# Patient Record
Sex: FEMALE | Race: BLACK OR AFRICAN AMERICAN | NOT HISPANIC OR LATINO
[De-identification: names, ages, dates, MRNs, and addresses within clinical notes are randomized per-mention and may not be internally consistent; named-entity substitution may affect disease eponyms.]

---

## 2024-02-06 PROBLEM — Z00.00 ENCOUNTER FOR PREVENTIVE HEALTH EXAMINATION: Status: ACTIVE | Noted: 2024-02-06

## 2024-02-09 ENCOUNTER — APPOINTMENT (OUTPATIENT)
Dept: COLORECTAL SURGERY | Facility: CLINIC | Age: 64
End: 2024-02-09

## 2024-02-16 ENCOUNTER — APPOINTMENT (OUTPATIENT)
Dept: COLORECTAL SURGERY | Facility: CLINIC | Age: 64
End: 2024-02-16
Payer: MEDICAID

## 2024-02-16 ENCOUNTER — NON-APPOINTMENT (OUTPATIENT)
Age: 64
End: 2024-02-16

## 2024-02-16 VITALS
BODY MASS INDEX: 37.56 KG/M2 | WEIGHT: 220 LBS | HEART RATE: 78 BPM | HEIGHT: 64 IN | SYSTOLIC BLOOD PRESSURE: 160 MMHG | TEMPERATURE: 97.5 F | DIASTOLIC BLOOD PRESSURE: 93 MMHG

## 2024-02-16 DIAGNOSIS — Z86.018 PERSONAL HISTORY OF OTHER BENIGN NEOPLASM: ICD-10-CM

## 2024-02-16 DIAGNOSIS — F17.200 NICOTINE DEPENDENCE, UNSPECIFIED, UNCOMPLICATED: ICD-10-CM

## 2024-02-16 DIAGNOSIS — M19.90 UNSPECIFIED OSTEOARTHRITIS, UNSPECIFIED SITE: ICD-10-CM

## 2024-02-16 DIAGNOSIS — Z78.9 OTHER SPECIFIED HEALTH STATUS: ICD-10-CM

## 2024-02-16 DIAGNOSIS — Z83.3 FAMILY HISTORY OF DIABETES MELLITUS: ICD-10-CM

## 2024-02-16 DIAGNOSIS — F32.A DEPRESSION, UNSPECIFIED: ICD-10-CM

## 2024-02-16 DIAGNOSIS — Z82.49 FAMILY HISTORY OF ISCHEMIC HEART DISEASE AND OTHER DISEASES OF THE CIRCULATORY SYSTEM: ICD-10-CM

## 2024-02-16 DIAGNOSIS — M54.9 DORSALGIA, UNSPECIFIED: ICD-10-CM

## 2024-02-16 DIAGNOSIS — G89.29 DORSALGIA, UNSPECIFIED: ICD-10-CM

## 2024-02-16 DIAGNOSIS — I10 ESSENTIAL (PRIMARY) HYPERTENSION: ICD-10-CM

## 2024-02-16 PROCEDURE — 99204 OFFICE O/P NEW MOD 45 MIN: CPT

## 2024-02-16 NOTE — PHYSICAL EXAM
[FreeTextEntry1] :  Gastrointestinal: No abdominal masses.   Liver: nontender.   Spleen: nontender.   General Appearance: Looks well in no distress, of stated age.   HEENT: Pupils equal reactive to light normocephalic atraumatic.   Neck: no jugular venous distention.   Respiratory: Normal breath sounds.   Cardiovascular: normal heart sounds and normal rate and rhythm.   Musculoskeletal: Moves all 4 extremities appropriately with 5 over 5 strength.   Skin:. no rash or lesion.   Neurologic: alert, oriented to person, oriented to place and oriented to time.   Psychiatric: calm.

## 2024-02-16 NOTE — ASSESSMENT
[FreeTextEntry1] : I reviewed with the patient her findings on colonoscopy are consistent with a sigmoid polyp and cecal polyp possible underlying cancer.  I recommended that we given her limited mobility we admit her to the hospital for bowel preparation, imaging and plans for advanced GI colonoscopy and attempted polypectomy of the sigmoid colon polyp.  I have outlined to her that we would proceed with a planned robotic right hemicolectomy for the management of her cecal mass.  The risks and befits of the treatment plan were reviewed and outlined with the patient. The patient understands the associated risks of the involved treatment and wishes to proceed. All questions were answered and explained.

## 2024-02-16 NOTE — HISTORY OF PRESENT ILLNESS
[FreeTextEntry1] : 62 y/o F presents for initial evaluation of cecal mass noted on screening colonoscopy Referred by GI Dr. Guido Evans  [facility] Depression, arthritis, HTN. Pt denies h/o DMII as indicated on intake form Current tobacco use x 10 years Medication list unavailable-will contact [facility] pharmacy for list and admits to taking ASA 81 mg Followed by Pain management- takes 1 tab oxycodone 30 mg three times per week at bedtime PRN for arthritis/lumbar disk pain PSH , right breast lumpectomy (benign), B/L knee replacement  Initial colonoscopy performed 24, Poor prep, scope advanced to cecum. Large mass just distal to cecum occluding 2/3 lumen w/ cancerous appearance, multiple biopsies taken. Ascending, transverse and descending colon normal. Sigmoid colon noted large polyp on stalk, however not removed 2/2 poor prep. Small internal hemorrhoids noted.  Pathology: Colon, cecum mass biopsy: Fragments of tubulovillous adenoma. There is no evidence of high grade dysplasia or malignancy.   Pt admits to onset of intermittent lower abdominal pain one month ago, walks around to relieve pain. Admits to losing 25 lbs over the last year w/ change in diet (avoiding fried foods).  Moving bowels every 2 days, soft/formed.  Not taking stool softeners or fiber supplement  No imaging ordered per pt Of note, she lives in a 5th floor walk up apartment and ambulates with walker

## 2024-03-21 ENCOUNTER — APPOINTMENT (OUTPATIENT)
Dept: INTERNAL MEDICINE | Facility: CLINIC | Age: 64
End: 2024-03-21

## 2024-03-25 ENCOUNTER — INPATIENT (INPATIENT)
Facility: HOSPITAL | Age: 64
LOS: 4 days | Discharge: HOME | DRG: 394 | End: 2024-03-30
Attending: SURGERY | Admitting: SURGERY
Payer: MEDICAID

## 2024-03-25 VITALS
TEMPERATURE: 98 F | HEART RATE: 71 BPM | SYSTOLIC BLOOD PRESSURE: 162 MMHG | HEIGHT: 65 IN | OXYGEN SATURATION: 100 % | WEIGHT: 227.96 LBS | DIASTOLIC BLOOD PRESSURE: 97 MMHG | RESPIRATION RATE: 17 BRPM

## 2024-03-25 DIAGNOSIS — Z98.891 HISTORY OF UTERINE SCAR FROM PREVIOUS SURGERY: Chronic | ICD-10-CM

## 2024-03-25 DIAGNOSIS — Z96.652 PRESENCE OF LEFT ARTIFICIAL KNEE JOINT: Chronic | ICD-10-CM

## 2024-03-25 LAB
ANION GAP SERPL CALC-SCNC: 8 MMOL/L — SIGNIFICANT CHANGE UP (ref 5–17)
APTT BLD: 33.8 SEC — SIGNIFICANT CHANGE UP (ref 24.5–35.6)
BLD GP AB SCN SERPL QL: NEGATIVE — SIGNIFICANT CHANGE UP
BUN SERPL-MCNC: 14 MG/DL — SIGNIFICANT CHANGE UP (ref 7–23)
CALCIUM SERPL-MCNC: 9.6 MG/DL — SIGNIFICANT CHANGE UP (ref 8.4–10.5)
CHLORIDE SERPL-SCNC: 103 MMOL/L — SIGNIFICANT CHANGE UP (ref 96–108)
CO2 SERPL-SCNC: 27 MMOL/L — SIGNIFICANT CHANGE UP (ref 22–31)
CREAT SERPL-MCNC: 0.79 MG/DL — SIGNIFICANT CHANGE UP (ref 0.5–1.3)
EGFR: 84 ML/MIN/1.73M2 — SIGNIFICANT CHANGE UP
GLUCOSE SERPL-MCNC: 91 MG/DL — SIGNIFICANT CHANGE UP (ref 70–99)
HCT VFR BLD CALC: 32.9 % — LOW (ref 34.5–45)
HGB BLD-MCNC: 10.6 G/DL — LOW (ref 11.5–15.5)
INR BLD: 0.88 — SIGNIFICANT CHANGE UP (ref 0.85–1.18)
MCHC RBC-ENTMCNC: 31.6 PG — SIGNIFICANT CHANGE UP (ref 27–34)
MCHC RBC-ENTMCNC: 32.2 GM/DL — SIGNIFICANT CHANGE UP (ref 32–36)
MCV RBC AUTO: 98.2 FL — SIGNIFICANT CHANGE UP (ref 80–100)
NRBC # BLD: 0 /100 WBCS — SIGNIFICANT CHANGE UP (ref 0–0)
PLATELET # BLD AUTO: 221 K/UL — SIGNIFICANT CHANGE UP (ref 150–400)
POTASSIUM SERPL-MCNC: 3.8 MMOL/L — SIGNIFICANT CHANGE UP (ref 3.5–5.3)
POTASSIUM SERPL-SCNC: 3.8 MMOL/L — SIGNIFICANT CHANGE UP (ref 3.5–5.3)
PROTHROM AB SERPL-ACNC: 10.1 SEC — SIGNIFICANT CHANGE UP (ref 9.5–13)
RBC # BLD: 3.35 M/UL — LOW (ref 3.8–5.2)
RBC # FLD: 13.3 % — SIGNIFICANT CHANGE UP (ref 10.3–14.5)
RH IG SCN BLD-IMP: POSITIVE — SIGNIFICANT CHANGE UP
RH IG SCN BLD-IMP: POSITIVE — SIGNIFICANT CHANGE UP
SODIUM SERPL-SCNC: 138 MMOL/L — SIGNIFICANT CHANGE UP (ref 135–145)
WBC # BLD: 5.13 K/UL — SIGNIFICANT CHANGE UP (ref 3.8–10.5)
WBC # FLD AUTO: 5.13 K/UL — SIGNIFICANT CHANGE UP (ref 3.8–10.5)

## 2024-03-25 PROCEDURE — 99285 EMERGENCY DEPT VISIT HI MDM: CPT

## 2024-03-25 PROCEDURE — 71260 CT THORAX DX C+: CPT | Mod: 26,MC

## 2024-03-25 PROCEDURE — 74177 CT ABD & PELVIS W/CONTRAST: CPT | Mod: 26,MC

## 2024-03-25 PROCEDURE — 71046 X-RAY EXAM CHEST 2 VIEWS: CPT | Mod: 26

## 2024-03-25 RX ORDER — IOHEXOL 300 MG/ML
30 INJECTION, SOLUTION INTRAVENOUS ONCE
Refills: 0 | Status: COMPLETED | OUTPATIENT
Start: 2024-03-25 | End: 2024-03-25

## 2024-03-25 RX ORDER — PANTOPRAZOLE SODIUM 20 MG/1
40 TABLET, DELAYED RELEASE ORAL DAILY
Refills: 0 | Status: DISCONTINUED | OUTPATIENT
Start: 2024-03-25 | End: 2024-03-30

## 2024-03-25 RX ORDER — ONDANSETRON 8 MG/1
4 TABLET, FILM COATED ORAL ONCE
Refills: 0 | Status: COMPLETED | OUTPATIENT
Start: 2024-03-25 | End: 2024-03-25

## 2024-03-25 RX ORDER — INFLUENZA VIRUS VACCINE 15; 15; 15; 15 UG/.5ML; UG/.5ML; UG/.5ML; UG/.5ML
0.5 SUSPENSION INTRAMUSCULAR ONCE
Refills: 0 | Status: DISCONTINUED | OUTPATIENT
Start: 2024-03-25 | End: 2024-03-30

## 2024-03-25 RX ORDER — ACETAMINOPHEN 500 MG
650 TABLET ORAL EVERY 6 HOURS
Refills: 0 | Status: DISCONTINUED | OUTPATIENT
Start: 2024-03-25 | End: 2024-03-27

## 2024-03-25 RX ORDER — ACETAMINOPHEN 500 MG
975 TABLET ORAL ONCE
Refills: 0 | Status: COMPLETED | OUTPATIENT
Start: 2024-03-25 | End: 2024-03-25

## 2024-03-25 RX ORDER — HEPARIN SODIUM 5000 [USP'U]/ML
5000 INJECTION INTRAVENOUS; SUBCUTANEOUS EVERY 8 HOURS
Refills: 0 | Status: DISCONTINUED | OUTPATIENT
Start: 2024-03-25 | End: 2024-03-25

## 2024-03-25 RX ORDER — ONDANSETRON 8 MG/1
4 TABLET, FILM COATED ORAL EVERY 6 HOURS
Refills: 0 | Status: DISCONTINUED | OUTPATIENT
Start: 2024-03-25 | End: 2024-03-30

## 2024-03-25 RX ORDER — SOD SULF/SODIUM/NAHCO3/KCL/PEG
4000 SOLUTION, RECONSTITUTED, ORAL ORAL ONCE
Refills: 0 | Status: COMPLETED | OUTPATIENT
Start: 2024-03-25 | End: 2024-03-25

## 2024-03-25 RX ORDER — HEPARIN SODIUM 5000 [USP'U]/ML
7500 INJECTION INTRAVENOUS; SUBCUTANEOUS EVERY 8 HOURS
Refills: 0 | Status: DISCONTINUED | OUTPATIENT
Start: 2024-03-25 | End: 2024-03-27

## 2024-03-25 RX ADMIN — Medication 975 MILLIGRAM(S): at 13:36

## 2024-03-25 RX ADMIN — IOHEXOL 30 MILLILITER(S): 300 INJECTION, SOLUTION INTRAVENOUS at 13:36

## 2024-03-25 RX ADMIN — ONDANSETRON 4 MILLIGRAM(S): 8 TABLET, FILM COATED ORAL at 13:36

## 2024-03-25 RX ADMIN — HEPARIN SODIUM 7500 UNIT(S): 5000 INJECTION INTRAVENOUS; SUBCUTANEOUS at 21:44

## 2024-03-25 NOTE — ED PROVIDER NOTE - CLINICAL SUMMARY MEDICAL DECISION MAKING FREE TEXT BOX
patient sent by surgery for admission to further evaluate of colonic mass seen on colonoscopy in February.  Plan for preop labs, will discuss with surgery   regarding other imaging or preop needs.  Plan for admit to surgery.

## 2024-03-25 NOTE — H&P ADULT - NSHPLABSRESULTS_GEN_ALL_CORE
LABS:                        10.6   5.13  )-----------( 221      ( 25 Mar 2024 13:02 )             32.9     03-25    138  |  103  |  14  ----------------------------<  91  3.8   |  27  |  0.79    Ca    9.6      25 Mar 2024 13:02      PT/INR - ( 25 Mar 2024 13:02 )   PT: 10.1 sec;   INR: 0.88          PTT - ( 25 Mar 2024 13:02 )  PTT:33.8 sec  Urinalysis Basic - ( 25 Mar 2024 13:02 )    Color: x / Appearance: x / SG: x / pH: x  Gluc: 91 mg/dL / Ketone: x  / Bili: x / Urobili: x   Blood: x / Protein: x / Nitrite: x   Leuk Esterase: x / RBC: x / WBC x   Sq Epi: x / Non Sq Epi: x / Bacteria: x        RADIOLOGY AND ADDITIONAL TESTS: Reviewed

## 2024-03-25 NOTE — ED PROVIDER NOTE - OBJECTIVE STATEMENT
62 yo F h/o Arthritis, hypertension, lumbar disc issues sent by Dr. Flynn for evaluation and admission.  Patient had outpatient colonoscopy in February that showed a cecal mass, but was poor prep.  Patient's biopsies were concerning for tubular villous adenoma.  Patient is here to have further evaluation including imaging as well as a repeat colonoscopy after better prep.  Patient notes 2 months of lower abdominal intermittent cramping pain.  Patient also notes some looser stool, nonbloody.  No weight loss, positive night sweats which patient attributes to menopause.  No family history of colon cancer, no prior colonoscopies.  No fever.  Patient notes occasional nausea.  No vomiting.  Pt also notes low back pain but feels it's similar to her disc related sx.

## 2024-03-25 NOTE — H&P ADULT - ASSESSMENT
63 year old female with PMH HTN, Depression, Arthritis presents to [facility] ED referred by Dr. Flynn for 2 month history of abdominal pain in relation to recently discovered poplyps in cecum and sigmoid colon.     Admit to Dr. Flynn, Team 5, regional  Regular diet   OR 3/28 for R hemicolectomy   Pain/nausea control PRN   Prep for colonoscopy 3/26  Advanced GI eval for colonoscopy 3/27  Preoperative risk stratification   SCDs/OOBA/SQH   Rest of plans pending clinical course   Plan of care discussed with attending and chief resident on call

## 2024-03-25 NOTE — ED PROVIDER NOTE - PROGRESS NOTE DETAILS
Discussed with surgery who would like a CT abdomen pelvis as well as preop labs, EKG, chest x-ray. CT done, results pending; surg added on ct chest.  Surg will fu on pending imaging results; per surg, tba to Dr Flynn [facility].

## 2024-03-25 NOTE — PATIENT PROFILE ADULT - FALL HARM RISK - HARM RISK INTERVENTIONS

## 2024-03-25 NOTE — H&P ADULT - NSHPPHYSICALEXAM_GEN_ALL_CORE
CONSTITUTIONAL: Awake, alert.  Nontoxic, no acute distress.  HEAD: Normocephalic, atraumatic.  EYES: Conjunctivae clear without exudates or hemorrhage. Sclera is non-icteric.  ENT: Normal appearing external ears, nose, mucous membranes moist.  NECK: supple, trachea midline.  HEART:  Normal rate, regular rhythm.    LUNGS:  No acute respiratory distress.  Non-tachypneic and non-labored.  ABDOMEN: abdomen soft, non distended, tender to palpation mildly towards RLQ, no rebound or guarding. Well healed Pfannestiel scar   MUSCULOSKELETAL:  Moving all extremities without issue.  SKIN: Skin in warm, dry and intact without rashes or lesions.  Appropriate color for ethnicity.  NEUROLOGICAL:  Patient is alert, oriented x person, place and time.  PSYCH: Appropriate mood and affect. Good judgment and insight.

## 2024-03-25 NOTE — ED ADULT TRIAGE NOTE - CHIEF COMPLAINT QUOTE
pt c/o abdominal pain and back pain x 2 months. sent by colo- rectal surgery for admission. as per noted colonoscopy 3/27, surgery 3/28 for a tubulovillous adenoma"

## 2024-03-25 NOTE — H&P ADULT - HISTORY OF PRESENT ILLNESS
63 year old female with PMH HTN, Depression, Arthritis presents to [facility] ED referred by Dr. Flynn for 2 month history of abdominal pain in relation to a recently discovered poplyps in cecum and sigmoid colon.     Initial colonoscopy performed 2/2/24, Poor prep, scope advanced to cecum.  Large mass just distal to cecum occluding 2/3 lumen w/ cancerous appearance, multiple biopsies taken. Ascending, transverse and descending colon normal. Sigmoid colon noted large polyp on stalk, however not removed 2/2 poor prep. Small internal hemorrhoids noted. 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 There is no evidence of high grade dysplasia or malignancy. Pt admits to onset of intermittent lower abdominal pain two month ago, walks around to relieve pain. Admits to losing 25 lbs over the last year w/ change in diet (avoiding fried foods). Moving bowels every once a day, soft/formed, no blood in toilet bowel or when wiping. Not taking stool softeners or fiber supplement. Patient follows up with Pain Medicine specialists for her chronic joint pain due to her arthitis (oxycodone 30mg 3 times a week). Denies fever, chills, headache, nauea, vomiting, palpitations, bloody bowel movements, urinary symptoms, acute changes in bowel pattern.     In the ED, hypertensive to 162/97, HR wnl, afebrile, saturating appropriately on RA  On exam, abdomen soft, non distended, tender to palpation mildly towards RLQ, no rebound or guarding. Well healed Pfannestiel scar   Labs significant for Hb 10.6, rest wnl   CT scan with PO and IV contrast shows

## 2024-03-25 NOTE — ED ADULT NURSE NOTE - OBJECTIVE STATEMENT
62 y/o F presents to ED as per Rina DARNELL for abdominal evaluation. Pt endorses x2 months of lower abdominal pain, loose stool (non bloody, intermittent nausea. Pt denies chest pain, SOB, fever, chills, vomiting, numbness, tingling, headache, lightheadedness, dizziness. PT A&Ox4, respirations even and unlabored, skin color WDL warm and dry, pt is ambulatory with a steady gait. No acute distress observed.

## 2024-03-26 LAB
ANION GAP SERPL CALC-SCNC: 9 MMOL/L — SIGNIFICANT CHANGE UP (ref 5–17)
BUN SERPL-MCNC: 13 MG/DL — SIGNIFICANT CHANGE UP (ref 7–23)
CALCIUM SERPL-MCNC: 9.3 MG/DL — SIGNIFICANT CHANGE UP (ref 8.4–10.5)
CEA SERPL-MCNC: 2.6 NG/ML — SIGNIFICANT CHANGE UP (ref 0–3.8)
CHLORIDE SERPL-SCNC: 103 MMOL/L — SIGNIFICANT CHANGE UP (ref 96–108)
CO2 SERPL-SCNC: 26 MMOL/L — SIGNIFICANT CHANGE UP (ref 22–31)
CREAT SERPL-MCNC: 0.89 MG/DL — SIGNIFICANT CHANGE UP (ref 0.5–1.3)
EGFR: 73 ML/MIN/1.73M2 — SIGNIFICANT CHANGE UP
GLUCOSE SERPL-MCNC: 95 MG/DL — SIGNIFICANT CHANGE UP (ref 70–99)
HCT VFR BLD CALC: 32.1 % — LOW (ref 34.5–45)
HCV AB S/CO SERPL IA: 0.04 S/CO — SIGNIFICANT CHANGE UP
HCV AB SERPL-IMP: SIGNIFICANT CHANGE UP
HGB BLD-MCNC: 10.2 G/DL — LOW (ref 11.5–15.5)
MAGNESIUM SERPL-MCNC: 2 MG/DL — SIGNIFICANT CHANGE UP (ref 1.6–2.6)
MCHC RBC-ENTMCNC: 31.8 GM/DL — LOW (ref 32–36)
MCHC RBC-ENTMCNC: 31.8 PG — SIGNIFICANT CHANGE UP (ref 27–34)
MCV RBC AUTO: 100 FL — SIGNIFICANT CHANGE UP (ref 80–100)
NRBC # BLD: 0 /100 WBCS — SIGNIFICANT CHANGE UP (ref 0–0)
PHOSPHATE SERPL-MCNC: 4.3 MG/DL — SIGNIFICANT CHANGE UP (ref 2.5–4.5)
PLATELET # BLD AUTO: 219 K/UL — SIGNIFICANT CHANGE UP (ref 150–400)
POTASSIUM SERPL-MCNC: 3.9 MMOL/L — SIGNIFICANT CHANGE UP (ref 3.5–5.3)
POTASSIUM SERPL-SCNC: 3.9 MMOL/L — SIGNIFICANT CHANGE UP (ref 3.5–5.3)
RBC # BLD: 3.21 M/UL — LOW (ref 3.8–5.2)
RBC # FLD: 13.2 % — SIGNIFICANT CHANGE UP (ref 10.3–14.5)
SODIUM SERPL-SCNC: 138 MMOL/L — SIGNIFICANT CHANGE UP (ref 135–145)
WBC # BLD: 4.84 K/UL — SIGNIFICANT CHANGE UP (ref 3.8–10.5)
WBC # FLD AUTO: 4.84 K/UL — SIGNIFICANT CHANGE UP (ref 3.8–10.5)

## 2024-03-26 PROCEDURE — 99222 1ST HOSP IP/OBS MODERATE 55: CPT | Mod: GC

## 2024-03-26 PROCEDURE — 99222 1ST HOSP IP/OBS MODERATE 55: CPT

## 2024-03-26 PROCEDURE — 99223 1ST HOSP IP/OBS HIGH 75: CPT

## 2024-03-26 RX ORDER — HYDRALAZINE HCL 50 MG
10 TABLET ORAL ONCE
Refills: 0 | Status: COMPLETED | OUTPATIENT
Start: 2024-03-26 | End: 2024-03-26

## 2024-03-26 RX ORDER — SODIUM CHLORIDE 9 MG/ML
1000 INJECTION, SOLUTION INTRAVENOUS
Refills: 0 | Status: DISCONTINUED | OUTPATIENT
Start: 2024-03-26 | End: 2024-03-27

## 2024-03-26 RX ORDER — LOSARTAN POTASSIUM 100 MG/1
50 TABLET, FILM COATED ORAL DAILY
Refills: 0 | Status: COMPLETED | OUTPATIENT
Start: 2024-03-26 | End: 2024-03-26

## 2024-03-26 RX ORDER — HYDRALAZINE HCL 50 MG
10 TABLET ORAL ONCE
Refills: 0 | Status: DISCONTINUED | OUTPATIENT
Start: 2024-03-26 | End: 2024-03-26

## 2024-03-26 RX ORDER — SOD SULF/SODIUM/NAHCO3/KCL/PEG
4000 SOLUTION, RECONSTITUTED, ORAL ORAL ONCE
Refills: 0 | Status: COMPLETED | OUTPATIENT
Start: 2024-03-26 | End: 2024-03-26

## 2024-03-26 RX ADMIN — LOSARTAN POTASSIUM 50 MILLIGRAM(S): 100 TABLET, FILM COATED ORAL at 14:29

## 2024-03-26 RX ADMIN — Medication 4000 MILLILITER(S): at 00:00

## 2024-03-26 RX ADMIN — HEPARIN SODIUM 7500 UNIT(S): 5000 INJECTION INTRAVENOUS; SUBCUTANEOUS at 14:29

## 2024-03-26 RX ADMIN — Medication 4000 MILLILITER(S): at 17:27

## 2024-03-26 RX ADMIN — Medication 650 MILLIGRAM(S): at 12:50

## 2024-03-26 RX ADMIN — Medication 10 MILLIGRAM(S): at 20:46

## 2024-03-26 RX ADMIN — Medication 650 MILLIGRAM(S): at 23:45

## 2024-03-26 RX ADMIN — Medication 650 MILLIGRAM(S): at 12:13

## 2024-03-26 RX ADMIN — Medication 10 MILLIGRAM(S): at 23:06

## 2024-03-26 RX ADMIN — PANTOPRAZOLE SODIUM 40 MILLIGRAM(S): 20 TABLET, DELAYED RELEASE ORAL at 11:05

## 2024-03-26 RX ADMIN — Medication 650 MILLIGRAM(S): at 23:06

## 2024-03-26 RX ADMIN — Medication 10 MILLIGRAM(S): at 16:49

## 2024-03-26 RX ADMIN — HEPARIN SODIUM 7500 UNIT(S): 5000 INJECTION INTRAVENOUS; SUBCUTANEOUS at 22:05

## 2024-03-26 RX ADMIN — Medication 10 MILLIGRAM(S): at 12:37

## 2024-03-26 RX ADMIN — HEPARIN SODIUM 7500 UNIT(S): 5000 INJECTION INTRAVENOUS; SUBCUTANEOUS at 05:11

## 2024-03-26 NOTE — CONSULT NOTE ADULT - SUBJECTIVE AND OBJECTIVE BOX
INTERNAL MEDICINE SERVICE INITIAL CONSULT NOTE    HPI:  63 year old female with PMH HTN, Depression, Arthritis presents to [facility] ED referred by Dr. Flynn for 2 month history of abdominal pain in relation to a recently discovered poplyps in cecum and sigmoid colon.     Initial colonoscopy performed 2/2/24, Poor prep, scope advanced to cecum.  Large mass just distal to cecum occluding 2/3 lumen w/ cancerous appearance, multiple biopsies taken. Ascending, transverse and descending colon normal. Sigmoid colon noted large polyp on stalk, however not removed 2/2 poor prep. Small internal hemorrhoids noted. 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 There is no evidence of high grade dysplasia or malignancy. Pt admits to onset of intermittent lower abdominal pain two month ago, walks around to relieve pain. Admits to losing 25 lbs over the last year w/ change in diet (avoiding fried foods). Moving bowels every once a day, soft/formed, no blood in toilet bowel or when wiping. Not taking stool softeners or fiber supplement. Patient follows up with Pain Medicine specialists for her chronic joint pain due to her arthitis (oxycodone 30mg 3 times a week). Denies fever, chills, headache, nauea, vomiting, palpitations, bloody bowel movements, urinary symptoms, acute changes in bowel pattern.     In the ED, hypertensive to 162/97, HR wnl, afebrile, saturating appropriately on RA  On exam, abdomen soft, non distended, tender to palpation mildly towards RLQ, no rebound or guarding. Well healed Pfannestiel scar   Labs significant for Hb 10.6, rest wnl   CT scan with PO and IV contrast shows    (25 Mar 2024 17:47)      ADDITIONAL MEDICINE HPI:    REVIEW OF SYSTEMS:   Otherwise negative except as specified in HPI    PAST MEDICAL HISTORY:     PAST SURGICAL HISTORY:    FAMILY HISTORY:    SOCIAL HISTORY:  Tobacco use:  EtOH use:  Illicit drug use:    MEDICATIONS:  MEDICATIONS  (STANDING):  heparin   Injectable 7500 Unit(s) SubCutaneous every 8 hours  influenza   Vaccine 0.5 milliLiter(s) IntraMuscular once  pantoprazole  Injectable 40 milliGRAM(s) IV Push daily    MEDICATIONS  (PRN):  acetaminophen     Tablet .. 650 milliGRAM(s) Oral every 6 hours PRN Temp greater or equal to 38C (100.4F), Mild Pain (1 - 3), Moderate Pain (4 - 6)  ondansetron Injectable 4 milliGRAM(s) IV Push every 6 hours PRN Nausea      ALLERGIES:  Allergies    No Known Allergies    Intolerances        VITAL SIGNS:  Vital Signs Last 24 Hrs  T(C): 36.7 (26 Mar 2024 08:10), Max: 36.8 (25 Mar 2024 12:52)  T(F): 98.1 (26 Mar 2024 08:10), Max: 98.3 (25 Mar 2024 17:34)  HR: 66 (26 Mar 2024 08:10) (60 - 71)  BP: 164/77 (26 Mar 2024 08:10) (151/75 - 164/95)  BP(mean): --  RR: 17 (26 Mar 2024 08:10) (17 - 18)  SpO2: 97% (26 Mar 2024 08:10) (97% - 100%)    Parameters below as of 26 Mar 2024 08:10  Patient On (Oxygen Delivery Method): room air        03-25-24 @ 07:01  -  03-26-24 @ 07:00  --------------------------------------------------------  IN:  Total IN: 0 mL    OUT:    Voided (mL): 200 mL  Total OUT: 200 mL    Total NET: -200 mL          PHYSICAL EXAM:  Constitutional: WDWN resting comfortably in bed; NAD  Head: NC/AT  Eyes: PERRL, EOMI, anicteric sclera  ENT: no nasal discharge; uvula midline, no oropharyngeal erythema or exudates; MMM  Neck: supple; no JVD or thyromegaly  Respiratory: CTA B/L; no W/R/R, no retractions  Cardiac: +S1/S2; RRR; no M/R/G; PMI non-displaced  Gastrointestinal: abdomen soft, NT/ND; no rebound or guarding; +BSx4  Genitourinary: normal external genitalia  Back: spine midline, no bony tenderness or step-offs; no CVAT B/L  Extremities: WWP, no clubbing or cyanosis; no peripheral edema  Musculoskeletal: NROM x4; no joint swelling, tenderness or erythema  Vascular: 2+ radial, femoral, DP/PT pulses B/L  Dermatologic: skin warm, dry and intact; no rashes, wounds, or scars  Lymphatic: no submandibular or cervical LAD  Neurologic: AAOx3; CNII-XII grossly intact; no focal deficits  Psychiatric: affect and characteristics of appearance, verbalizations, behaviors are appropriate    LABS:                        10.2   4.84  )-----------( 219      ( 26 Mar 2024 05:30 )             32.1     03-26    138  |  103  |  13  ----------------------------<  95  3.9   |  26  |  0.89    Ca    9.3      26 Mar 2024 05:30  Phos  4.3     03-26  Mg     2.0     03-26      PT/INR - ( 25 Mar 2024 13:02 )   PT: 10.1 sec;   INR: 0.88          PTT - ( 25 Mar 2024 13:02 )  PTT:33.8 sec  Urinalysis Basic - ( 26 Mar 2024 05:30 )    Color: x / Appearance: x / SG: x / pH: x  Gluc: 95 mg/dL / Ketone: x  / Bili: x / Urobili: x   Blood: x / Protein: x / Nitrite: x   Leuk Esterase: x / RBC: x / WBC x   Sq Epi: x / Non Sq Epi: x / Bacteria: x          CAPILLARY BLOOD GLUCOSE              RADIOLOGY & ADDITIONAL TESTS: Reviewed. INTERNAL MEDICINE SERVICE INITIAL CONSULT NOTE    HPI:  63 year old female with PMH HTN, Depression, Arthritis presents to [facility] ED referred by  Dr. Flynn for 2 month history of abdominal pain i/s/o recently discovered non obstructing mass just distal to cecum concerning for malignancy and pedunculated sigmoid polyp. Patient underwent her initial colonoscopy on 02/02/24 and was found to have a non-obstructing cecal mass with poor prep, s/p biopsy revealing tubulovillous adenoma. Patient presenting with persistent abdominal pain for 2 months. CTA/P revealed. 2.5 x 3.7 x 2.4 cm polypoid mass seen in the superior cecum, no definite extracolonic extension. Patient is planned for repeat colonoscopy 03/27 for polyp removal and potential hemicolectomy if needed. Patient is an active smoker, 2-3 cigs/week and drinks ETOH, last drink 1 week ago. No history of ETOH withdrawal, seizures, DTs, or intubation. On ASA for primary prevention. No recent AC or NSAID use. EKG NSR. Denies chest pain, SOB or MENDOZA on ambulation or rest. ET 4-5 blocks, 4 flights of stairs with assistance from walker. Admits to losing 25 lbs over the last year w/ change in diet (avoiding fried foods). Moving bowels every once a day, soft/formed, no blood in toilet bowel or when wiping. Not taking stool softeners or fiber supplement. Patient follows up with Pain Medicine specialists for her chronic joint pain due to her arthitis (oxycodone 30mg 3 times a week). Denies fever, chills, headache, nauea, vomiting, palpitations, bloody bowel movements, urinary symptoms, acute changes in bowel pattern. Medicine consulted for pre-op evaluation.      In the ED, hypertensive to 162/97, HR wnl, afebrile, saturating appropriately on RA  On exam, abdomen soft, non distended, tender to palpation mildly towards RLQ, no rebound or guarding. Well healed Pfannestiel scar   Labs significant for Hb 10.6, rest wnl   CT scan with PO and IV contrast shows    (25 Mar 2024 17:47)      REVIEW OF SYSTEMS:   Otherwise negative except as specified in HPI    PAST SURGICAL HISTORY:  Bilateral knee replacements, 2004    FAMILY HISTORY:  N/A    SOCIAL HISTORY:  Tobacco use: Smoker 2 cigs/week  EtOH use: Scotch, last drink last week  Illicit drug use: None    MEDICATIONS:  MEDICATIONS  (STANDING):  heparin   Injectable 7500 Unit(s) SubCutaneous every 8 hours  influenza   Vaccine 0.5 milliLiter(s) IntraMuscular once  pantoprazole  Injectable 40 milliGRAM(s) IV Push daily    MEDICATIONS  (PRN):  acetaminophen     Tablet .. 650 milliGRAM(s) Oral every 6 hours PRN Temp greater or equal to 38C (100.4F), Mild Pain (1 - 3), Moderate Pain (4 - 6)  ondansetron Injectable 4 milliGRAM(s) IV Push every 6 hours PRN Nausea      ALLERGIES:  Allergies    No Known Allergies    Intolerances        VITAL SIGNS:  Vital Signs Last 24 Hrs  T(C): 36.7 (26 Mar 2024 08:10), Max: 36.8 (25 Mar 2024 12:52)  T(F): 98.1 (26 Mar 2024 08:10), Max: 98.3 (25 Mar 2024 17:34)  HR: 66 (26 Mar 2024 08:10) (60 - 71)  BP: 164/77 (26 Mar 2024 08:10) (151/75 - 164/95)  BP(mean): --  RR: 17 (26 Mar 2024 08:10) (17 - 18)  SpO2: 97% (26 Mar 2024 08:10) (97% - 100%)    Parameters below as of 26 Mar 2024 08:10  Patient On (Oxygen Delivery Method): room air        03-25-24 @ 07:01  -  03-26-24 @ 07:00  --------------------------------------------------------  IN:  Total IN: 0 mL    OUT:    Voided (mL): 200 mL  Total OUT: 200 mL    Total NET: -200 mL          PHYSICAL EXAM:  Constitutional: WDWN resting comfortably in bed; NAD  Head: NC/AT  Eyes: PERRL, EOMI, anicteric sclera  ENT: no nasal discharge; uvula midline, no oropharyngeal erythema or exudates; MMM  Neck: supple; no JVD or thyromegaly  Respiratory: CTA B/L; no W/R/R, no retractions  Cardiac: +S1/S2; RRR; no M/R/G; PMI non-displaced  Gastrointestinal: abdomen soft, NT/ND; no rebound or guarding; +BSx4  Genitourinary: normal external genitalia  Back: spine midline, no bony tenderness or step-offs; no CVAT B/L  Extremities: WWP, no clubbing or cyanosis; no peripheral edema  Musculoskeletal: NROM x4; no joint swelling, tenderness or erythema  Vascular: 2+ radial, femoral, DP/PT pulses B/L  Dermatologic: skin warm, dry and intact; no rashes, wounds, or scars  Lymphatic: no submandibular or cervical LAD  Neurologic: AAOx3; CNII-XII grossly intact; no focal deficits  Psychiatric: affect and characteristics of appearance, verbalizations, behaviors are appropriate    LABS:                        10.2   4.84  )-----------( 219      ( 26 Mar 2024 05:30 )             32.1     03-26    138  |  103  |  13  ----------------------------<  95  3.9   |  26  |  0.89    Ca    9.3      26 Mar 2024 05:30  Phos  4.3     03-26  Mg     2.0     03-26      PT/INR - ( 25 Mar 2024 13:02 )   PT: 10.1 sec;   INR: 0.88          PTT - ( 25 Mar 2024 13:02 )  PTT:33.8 sec  Urinalysis Basic - ( 26 Mar 2024 05:30 )    Color: x / Appearance: x / SG: x / pH: x  Gluc: 95 mg/dL / Ketone: x  / Bili: x / Urobili: x   Blood: x / Protein: x / Nitrite: x   Leuk Esterase: x / RBC: x / WBC x   Sq Epi: x / Non Sq Epi: x / Bacteria: x          CAPILLARY BLOOD GLUCOSE              RADIOLOGY & ADDITIONAL TESTS: Reviewed.

## 2024-03-26 NOTE — CONSULT NOTE ADULT - ASSESSMENT
63F with PMHx HTN, Depression, Arthritis presents to [facility] ED referred by  Dr. Flynn for 2 month history of abdominal pain i/s/o recently discovered non obstructing mass just distal to cecum concerning for malignancy and pedunculated sigmoid polyp.     #Cecal Mass  #Pre-op  Patient underwent her initial colonoscopy on 02/02/24 and was found to have a non-obstructing cecal mass with poor prep, s/p biopsy revealing tubulovillous adenoma. Patient presenting with persistent abdominal pain for 2 months. CTA/P revealed. 2.5 x 3.7 x 2.4 cm polypoid mass seen in the superior cecum, no definite extracolonic extension. Patient is planned for repeat colonoscopy 03/27 for polyp removal and potential hemicolectomy if needed. Patient is an active smoker, 2-3 cigs/week and drinks ETOH, last drink 1 week ago. No history of ETOH withdrawal, seizures, DTs, or intubation. On ASA for primary prevention. No recent AC or NSAID use. EKG NSR. Denies chest pain, SOB or MENDOZA on ambulation or rest. ET 4-5 blocks, 4 flights of stairs with assistance from walker.   - RCRI Class I   - Chavez 0.4%  - METS >4 (ambulates with walker: ET 4-5 bocks, 4 flights of stairs)  - Continue to hold home ASA (for primary prevention, last dose 4 days ago)  - Offered Nicotine patch (smokes 2-3 cigarettes/week), however patient deferred  - Patient is low-risk for intermediate-risk procedure. No further cardiac work-up required at this time. Patient is medically optimized for upcoming colonoscopy/hemicolectomy    #HTN  On admission, presented with -160/90. On home Losartan 50mg. Today, patient was hypertensive to  while off Losartan s/p Hydralazine 10mg IVPx1  - Give home Losartan 50mg dose today as no planned interventions. Hold Losartan day of procedure tomorrow  - If SBP >180, can give Hydralazine 10mg IVP (preferred over Labetalol given HR 60-65)    Medicine will continue to follow along with you. Discussed case with Dr. Burnett

## 2024-03-26 NOTE — PROGRESS NOTE ADULT - SUBJECTIVE AND OBJECTIVE BOX
SUBJECTIVE: Patient seen and examined bedside by chief resident resting comfortably in bed. Tolerating clears. Patient endorses having bowel movements and voiding adequately. Patient admits to some RLQ pain and nausea without vomiting.     heparin   Injectable 7500 Unit(s) SubCutaneous every 8 hours    MEDICATIONS  (PRN):  acetaminophen     Tablet .. 650 milliGRAM(s) Oral every 6 hours PRN Temp greater or equal to 38C (100.4F), Mild Pain (1 - 3), Moderate Pain (4 - 6)  ondansetron Injectable 4 milliGRAM(s) IV Push every 6 hours PRN Nausea      I&O's Detail    25 Mar 2024 07:01  -  26 Mar 2024 07:00  --------------------------------------------------------  IN:  Total IN: 0 mL    OUT:    Voided (mL): 200 mL  Total OUT: 200 mL    Total NET: -200 mL          Vital Signs Last 24 Hrs  T(C): 36.6 (26 Mar 2024 04:26), Max: 36.8 (25 Mar 2024 12:52)  T(F): 97.9 (26 Mar 2024 04:26), Max: 98.3 (25 Mar 2024 17:34)  HR: 61 (26 Mar 2024 04:26) (60 - 71)  BP: 158/87 (26 Mar 2024 04:26) (151/75 - 164/95)  BP(mean): --  RR: 18 (26 Mar 2024 04:26) (17 - 18)  SpO2: 98% (26 Mar 2024 04:26) (97% - 100%)    Parameters below as of 26 Mar 2024 04:26  Patient On (Oxygen Delivery Method): room air        General: NAD, resting comfortably in bed  C/V: NSR  Pulm: Nonlabored breathing, no respiratory distress  Abd: soft, NT/ND  Extrem: WWP, no edema, SCDs in place    LABS:                        10.2   4.84  )-----------( 219      ( 26 Mar 2024 05:30 )             32.1     03-26    138  |  103  |  13  ----------------------------<  95  3.9   |  26  |  0.89    Ca    9.3      26 Mar 2024 05:30  Phos  4.3     03-26  Mg     2.0     03-26      PT/INR - ( 25 Mar 2024 13:02 )   PT: 10.1 sec;   INR: 0.88          PTT - ( 25 Mar 2024 13:02 )  PTT:33.8 sec  Urinalysis Basic - ( 26 Mar 2024 05:30 )    Color: x / Appearance: x / SG: x / pH: x  Gluc: 95 mg/dL / Ketone: x  / Bili: x / Urobili: x   Blood: x / Protein: x / Nitrite: x   Leuk Esterase: x / RBC: x / WBC x   Sq Epi: x / Non Sq Epi: x / Bacteria: x        RADIOLOGY & ADDITIONAL STUDIES:  CT Abdomen and Pelvis w/ Oral Cont and w/ IV Cont:   ACC: [id number] EXAM:  CT CHEST IC   ORDERED BY: RACHEL CASEY     ACC: [id number] EXAM:  CT ABDOMEN AND PELVIS OC IC   ORDERED BY: ANGEL LACY     PROCEDURE DATE:  03/25/2024          INTERPRETATION:  CLINICAL INFORMATION: Mass on recent colonoscopy.    COMPARISON: None.    CONTRAST/COMPLICATIONS:  IV Contrast: Isovue 370  90 cc administered   10 cc discarded  Oral Contrast: Gastroview  Complications: None reported at time of study completion    PROCEDURE:  CT of the Chest, Abdomenand Pelvis was performed.  Sagittal and coronal reformats were performed.    FINDINGS:  CHEST:  LUNGS AND LARGE AIRWAYS: Patent central airways. No pulmonary metastases.   3 mm calcified granuloma seen in the left lower lobe.  PLEURA: No pleural effusion.  VESSELS: Within normal limits. No central PE identified.  HEART: Heart size is normal. No pericardial effusion.  MEDIASTINUM AND RODRIGO: No lymphadenopathy.  CHEST WALL AND LOWER NECK: Enlarged left lobe thyroid containing at least   one 2.4 cm nodule. Probable right breast microclips.    ABDOMEN AND PELVIS:  LIVER: Nonenlarged. Mild fatty infiltration of liver. 1.1 cm hypodensity   in segment six and five of liver-probable cyst or biliary hamartoma.. 0.8   cm hypodensity in segment two of liver likely cyst or biliary hamartoma.  BILE DUCTS: Normal caliber.  GALLBLADDER: Within normal limits.  SPLEEN: Within normal limits.  PANCREAS: Within normal limits.  ADRENALS: Within normal limits.  KIDNEYS/URETERS: Normal size. No hydronephrosis. 4.2 cm cortical cyst   interpolar left kidney. 2.1 cm cortical cyst upper pole left kidney..    BLADDER: Within normal limits.  REPRODUCTIVE ORGANS: Anteverted fibroid uterus noted. The uterus measures   8.1 cm superoinferior dimension by 4.7 cm AP diameter by 5.3 cm diameter.   Unremarkable ovaries    BOWEL: The ingested oral contrast material has reached the mid transverse   colon. No bowel obstruction. Normal appendix. 2.5 x 3.7 x 2.4 cm polypoid   mass seen in the superior cecum. No definite extracolonic extension. 0.6   cm medial pericolic node. No bowel obstruction. Isolated colonic   diverticula.  PERITONEUM: No ascites.  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  RETROPERITONEUM/LYMPH NODES: No lymphadenopathy.  ABDOMINAL WALL: Within normal limits.  BONES: Degenerative disc disease L4-L5 and L5-S1. DISH involving thoracic   spine.    IMPRESSION:  Polypoid lesion seen in the superior cecum as described above.    No definitive evidence of metastases in the abdomen or chest.    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    --- End of Report ---            ANGELA REDDY MD; Attending Radiologist  This document has been electronically signed. Mar 25 2024  4:59PM (03-25-24 @ 16:13)      SUBJECTIVE: Patient seen and examined bedside by chief resident resting comfortably in bed. Tolerating clears. Patient endorses having bowel movements and voiding adequately. Patient admits to some RLQ pain and nausea without vomiting.     heparin   Injectable 7500 Unit(s) SubCutaneous every 8 hours    MEDICATIONS  (PRN):  acetaminophen     Tablet .. 650 milliGRAM(s) Oral every 6 hours PRN Temp greater or equal to 38C (100.4F), Mild Pain (1 - 3), Moderate Pain (4 - 6)  ondansetron Injectable 4 milliGRAM(s) IV Push every 6 hours PRN Nausea      I&O's Detail    25 Mar 2024 07:01  -  26 Mar 2024 07:00  --------------------------------------------------------  IN:  Total IN: 0 mL    OUT:    Voided (mL): 200 mL  Total OUT: 200 mL    Total NET: -200 mL          Vital Signs Last 24 Hrs  T(C): 36.6 (26 Mar 2024 04:26), Max: 36.8 (25 Mar 2024 12:52)  T(F): 97.9 (26 Mar 2024 04:26), Max: 98.3 (25 Mar 2024 17:34)  HR: 61 (26 Mar 2024 04:26) (60 - 71)  BP: 158/87 (26 Mar 2024 04:26) (151/75 - 164/95)  BP(mean): --  RR: 18 (26 Mar 2024 04:26) (17 - 18)  SpO2: 98% (26 Mar 2024 04:26) (97% - 100%)    Parameters below as of 26 Mar 2024 04:26  Patient On (Oxygen Delivery Method): room air        General: NAD, resting comfortably in bed  Pulm: Nonlabored breathing, no respiratory distress  Abd: soft, (+) RLQ ttp, non-distended  Extrem: WWP, no edema, SCDs in place    LABS:                        10.2   4.84  )-----------( 219      ( 26 Mar 2024 05:30 )             32.1     03-26    138  |  103  |  13  ----------------------------<  95  3.9   |  26  |  0.89    Ca    9.3      26 Mar 2024 05:30  Phos  4.3     03-26  Mg     2.0     03-26      PT/INR - ( 25 Mar 2024 13:02 )   PT: 10.1 sec;   INR: 0.88          PTT - ( 25 Mar 2024 13:02 )  PTT:33.8 sec  Urinalysis Basic - ( 26 Mar 2024 05:30 )    Color: x / Appearance: x / SG: x / pH: x  Gluc: 95 mg/dL / Ketone: x  / Bili: x / Urobili: x   Blood: x / Protein: x / Nitrite: x   Leuk Esterase: x / RBC: x / WBC x   Sq Epi: x / Non Sq Epi: x / Bacteria: x        RADIOLOGY & ADDITIONAL STUDIES:  CT Abdomen and Pelvis w/ Oral Cont and w/ IV Cont:   ACC: [id number] EXAM:  CT CHEST IC   ORDERED BY: RACHEL CASEY     ACC: [id number] EXAM:  CT ABDOMEN AND PELVIS OC IC   ORDERED BY: ANGEL LACY     PROCEDURE DATE:  03/25/2024          INTERPRETATION:  CLINICAL INFORMATION: Mass on recent colonoscopy.    COMPARISON: None.    CONTRAST/COMPLICATIONS:  IV Contrast: Isovue 370  90 cc administered   10 cc discarded  Oral Contrast: Gastroview  Complications: None reported at time of study completion    PROCEDURE:  CT of the Chest, Abdomenand Pelvis was performed.  Sagittal and coronal reformats were performed.    FINDINGS:  CHEST:  LUNGS AND LARGE AIRWAYS: Patent central airways. No pulmonary metastases.   3 mm calcified granuloma seen in the left lower lobe.  PLEURA: No pleural effusion.  VESSELS: Within normal limits. No central PE identified.  HEART: Heart size is normal. No pericardial effusion.  MEDIASTINUM AND RODRIGO: No lymphadenopathy.  CHEST WALL AND LOWER NECK: Enlarged left lobe thyroid containing at least   one 2.4 cm nodule. Probable right breast microclips.    ABDOMEN AND PELVIS:  LIVER: Nonenlarged. Mild fatty infiltration of liver. 1.1 cm hypodensity   in segment six and five of liver-probable cyst or biliary hamartoma.. 0.8   cm hypodensity in segment two of liver likely cyst or biliary hamartoma.  BILE DUCTS: Normal caliber.  GALLBLADDER: Within normal limits.  SPLEEN: Within normal limits.  PANCREAS: Within normal limits.  ADRENALS: Within normal limits.  KIDNEYS/URETERS: Normal size. No hydronephrosis. 4.2 cm cortical cyst   interpolar left kidney. 2.1 cm cortical cyst upper pole left kidney..    BLADDER: Within normal limits.  REPRODUCTIVE ORGANS: Anteverted fibroid uterus noted. The uterus measures   8.1 cm superoinferior dimension by 4.7 cm AP diameter by 5.3 cm diameter.   Unremarkable ovaries    BOWEL: The ingested oral contrast material has reached the mid transverse   colon. No bowel obstruction. Normal appendix. 2.5 x 3.7 x 2.4 cm polypoid   mass seen in the superior cecum. No definite extracolonic extension. 0.6   cm medial pericolic node. No bowel obstruction. Isolated colonic   diverticula.  PERITONEUM: No ascites.  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  RETROPERITONEUM/LYMPH NODES: No lymphadenopathy.  ABDOMINAL WALL: Within normal limits.  BONES: Degenerative disc disease L4-L5 and L5-S1. DISH involving thoracic   spine.    IMPRESSION:  Polypoid lesion seen in the superior cecum as described above.    No definitive evidence of metastases in the abdomen or chest.    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    --- End of Report ---            ANGELA REDDY MD; Attending Radiologist  This document has been electronically signed. Mar 25 2024  4:59PM (03-25-24 @ 16:13)      SUBJECTIVE: Patient seen and examined bedside by chief resident resting comfortably in bed. Tolerating clears. Patient endorses having bowel movements and voiding adequately. Patient admits to mild RLQ pain controlled with medication and nausea without vomiting. Denies chest pain, SOB, dizziness.     heparin   Injectable 7500 Unit(s) SubCutaneous every 8 hours    MEDICATIONS  (PRN):  acetaminophen     Tablet .. 650 milliGRAM(s) Oral every 6 hours PRN Temp greater or equal to 38C (100.4F), Mild Pain (1 - 3), Moderate Pain (4 - 6)  ondansetron Injectable 4 milliGRAM(s) IV Push every 6 hours PRN Nausea      I&O's Detail    25 Mar 2024 07:01  -  26 Mar 2024 07:00  --------------------------------------------------------  IN:  Total IN: 0 mL    OUT:    Voided (mL): 200 mL  Total OUT: 200 mL    Total NET: -200 mL          Vital Signs Last 24 Hrs  T(C): 36.6 (26 Mar 2024 04:26), Max: 36.8 (25 Mar 2024 12:52)  T(F): 97.9 (26 Mar 2024 04:26), Max: 98.3 (25 Mar 2024 17:34)  HR: 61 (26 Mar 2024 04:26) (60 - 71)  BP: 158/87 (26 Mar 2024 04:26) (151/75 - 164/95)  BP(mean): --  RR: 18 (26 Mar 2024 04:26) (17 - 18)  SpO2: 98% (26 Mar 2024 04:26) (97% - 100%)    Parameters below as of 26 Mar 2024 04:26  Patient On (Oxygen Delivery Method): room air        General: NAD, resting comfortably in bed  Pulm: Nonlabored breathing, no respiratory distress  Abd: soft, (+) RLQ ttp, non-distended  Extrem: WWP, no edema, SCDs in place    LABS:                        10.2   4.84  )-----------( 219      ( 26 Mar 2024 05:30 )             32.1     03-26    138  |  103  |  13  ----------------------------<  95  3.9   |  26  |  0.89    Ca    9.3      26 Mar 2024 05:30  Phos  4.3     03-26  Mg     2.0     03-26      PT/INR - ( 25 Mar 2024 13:02 )   PT: 10.1 sec;   INR: 0.88          PTT - ( 25 Mar 2024 13:02 )  PTT:33.8 sec  Urinalysis Basic - ( 26 Mar 2024 05:30 )    Color: x / Appearance: x / SG: x / pH: x  Gluc: 95 mg/dL / Ketone: x  / Bili: x / Urobili: x   Blood: x / Protein: x / Nitrite: x   Leuk Esterase: x / RBC: x / WBC x   Sq Epi: x / Non Sq Epi: x / Bacteria: x        RADIOLOGY & ADDITIONAL STUDIES:  CT Abdomen and Pelvis w/ Oral Cont and w/ IV Cont:   ACC: [id number] EXAM:  CT CHEST IC   ORDERED BY: RACHEL CASEY     ACC: [id number] EXAM:  CT ABDOMEN AND PELVIS OC IC   ORDERED BY: ANGEL LACY     PROCEDURE DATE:  03/25/2024          INTERPRETATION:  CLINICAL INFORMATION: Mass on recent colonoscopy.    COMPARISON: None.    CONTRAST/COMPLICATIONS:  IV Contrast: Isovue 370  90 cc administered   10 cc discarded  Oral Contrast: Gastroview  Complications: None reported at time of study completion    PROCEDURE:  CT of the Chest, Abdomenand Pelvis was performed.  Sagittal and coronal reformats were performed.    FINDINGS:  CHEST:  LUNGS AND LARGE AIRWAYS: Patent central airways. No pulmonary metastases.   3 mm calcified granuloma seen in the left lower lobe.  PLEURA: No pleural effusion.  VESSELS: Within normal limits. No central PE identified.  HEART: Heart size is normal. No pericardial effusion.  MEDIASTINUM AND RODRIGO: No lymphadenopathy.  CHEST WALL AND LOWER NECK: Enlarged left lobe thyroid containing at least   one 2.4 cm nodule. Probable right breast microclips.    ABDOMEN AND PELVIS:  LIVER: Nonenlarged. Mild fatty infiltration of liver. 1.1 cm hypodensity   in segment six and five of liver-probable cyst or biliary hamartoma.. 0.8   cm hypodensity in segment two of liver likely cyst or biliary hamartoma.  BILE DUCTS: Normal caliber.  GALLBLADDER: Within normal limits.  SPLEEN: Within normal limits.  PANCREAS: Within normal limits.  ADRENALS: Within normal limits.  KIDNEYS/URETERS: Normal size. No hydronephrosis. 4.2 cm cortical cyst   interpolar left kidney. 2.1 cm cortical cyst upper pole left kidney..    BLADDER: Within normal limits.  REPRODUCTIVE ORGANS: Anteverted fibroid uterus noted. The uterus measures   8.1 cm superoinferior dimension by 4.7 cm AP diameter by 5.3 cm diameter.   Unremarkable ovaries    BOWEL: The ingested oral contrast material has reached the mid transverse   colon. No bowel obstruction. Normal appendix. 2.5 x 3.7 x 2.4 cm polypoid   mass seen in the superior cecum. No definite extracolonic extension. 0.6   cm medial pericolic node. No bowel obstruction. Isolated colonic   diverticula.  PERITONEUM: No ascites.  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  RETROPERITONEUM/LYMPH NODES: No lymphadenopathy.  ABDOMINAL WALL: Within normal limits.  BONES: Degenerative disc disease L4-L5 and L5-S1. DISH involving thoracic   spine.    IMPRESSION:  Polypoid lesion seen in the superior cecum as described above.    No definitive evidence of metastases in the abdomen or chest.    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    --- End of Report ---            ANGELA REDDY MD; Attending Radiologist  This document has been electronically signed. Mar 25 2024  4:59PM (03-25-24 @ 16:13)

## 2024-03-26 NOTE — PROGRESS NOTE ADULT - ASSESSMENT
63 year old female with PMH HTN, Depression, Arthritis presents to [facility] ED referred by Dr. Flynn for 2 month history of abdominal pain in relation to recently discovered poplyps in cecum and sigmoid colon.     CLD/golytely at [facility]   Pain/nausea control PRN   OR 3/28 for R hemicolectomy   [facility] eval for colonoscopy 3/27  Preoperative risk stratification   SCDs/OOBA/SQH

## 2024-03-26 NOTE — CONSULT NOTE ADULT - SUBJECTIVE AND OBJECTIVE BOX
HPI:  63 year old female with PMH HTN, OA and chronic LBP on chronic opioids who presented to [facility] ED at request of Dr. Flynn for 2 month history of abdominal pain i/s/o recently discovered non obstructing mass just distal to cecum concerning for malignancy and pedunculated sigmoid polyp    Pt underwent Initial colonoscopy performed 24, Poor prep, scope advanced to cecum.    Findings:  Large mass just distal to cecum occluding 2/3 lumen w/ cancerous appearance, multiple biopsies taken. Ascending, transverse and descending colon normal. Sigmoid colon noted large polyp on stalk, however not removed 2/2 poor prep. Small internal hemorrhoids noted.     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 There is no evidence of high grade dysplasia or malignancy.     Saw Dr. Flynn as outpatient with plan for R hemicolectomy which will be done during this admission.    In ED, she describes intermittent lower abdominal pain x 2 months.  Also describes 25 lb weight loss over last year, but with dietary change.  No BRBPR.      In the ED, hypertensive to 162/97, HR wnl, afebrile, saturating appropriately on RA    CT A/P in ED showing   - The ingested oral contrast material has reached the mid transverse colon. No bowel obstruction. Normal appendix. 2.5 x 3.7 x 2.4 cm polypoid mass seen in the superior cecum. No definite extracolonic extension. 0.6 cm medial pericolic node. No bowel obstruction. Isolated colonic diverticula.  - No definitive evidence of metastases in the abdomen or chest.  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    At bedside, pt has completed 1/2 prep with continued brown stool. Still describes occasional R>LLQ. No nausea/vomiting.       Allergies    No Known Allergies    Intolerances      Home Medications:    MEDICATIONS:  MEDICATIONS  (STANDING):  heparin   Injectable 7500 Unit(s) SubCutaneous every 8 hours  influenza   Vaccine 0.5 milliLiter(s) IntraMuscular once  pantoprazole  Injectable 40 milliGRAM(s) IV Push daily    MEDICATIONS  (PRN):  acetaminophen     Tablet .. 650 milliGRAM(s) Oral every 6 hours PRN Temp greater or equal to 38C (100.4F), Mild Pain (1 - 3), Moderate Pain (4 - 6)  ondansetron Injectable 4 milliGRAM(s) IV Push every 6 hours PRN Nausea    PAST MEDICAL & SURGICAL HISTORY:  HTN (hypertension)      Lumbar herniated disc      H/O arthritis      H/O  section      H/O total knee replacement, left        FAMILY HISTORY:  No pertinent family history in first degree relatives        REVIEW OF SYSTEMS:  All other 10 review of systems is negative unless indicated above.    Vital Signs Last 24 Hrs  T(C): 36.7 (26 Mar 2024 08:10), Max: 36.8 (25 Mar 2024 12:52)  T(F): 98.1 (26 Mar 2024 08:10), Max: 98.3 (25 Mar 2024 17:34)  HR: 66 (26 Mar 2024 08:10) (60 - 71)  BP: 164/77 (26 Mar 2024 08:10) (151/75 - 164/95)  BP(mean): --  RR: 17 (26 Mar 2024 08:10) (17 - 18)  SpO2: 97% (26 Mar 2024 08:10) (97% - 100%)    Parameters below as of 26 Mar 2024 08:10  Patient On (Oxygen Delivery Method): room air         @ 07:01  -   @ 07:00  --------------------------------------------------------  IN: 0 mL / OUT: 200 mL / NET: -200 mL        PHYSICAL EXAM:    General: No acute distress  Eyes: Anicteric sclerae, moist conjunctivae  Neck: Trachea midline, supple  Lungs: Normal respiratory effort and no intercostal retractions  Abdomen: Soft, RLQ tenderness to palpation non-distended; No rebound or guarding  Extremities: Normal range of motion, No clubbing, cyanosis or edema  Neurological: Alert and oriented x3  Skin: Warm and dry. No obvious rash    LABS:                        10.2   4.84  )-----------( 219      ( 26 Mar 2024 05:30 )             32.1         138  |  103  |  13  ----------------------------<  95  3.9   |  26  |  0.89    Ca    9.3      26 Mar 2024 05:30  Phos  4.3       Mg     2.0     03-          PT/INR - ( 25 Mar 2024 13:02 )   PT: 10.1 sec;   INR: 0.88          PTT - ( 25 Mar 2024 13:02 )  PTT:33.8 sec    RADIOLOGY & ADDITIONAL STUDIES:       ACC: [id number] EXAM:  CT CHEST IC   ORDERED BY: RACHEL CASEY     ACC: [id number] EXAM:  CT ABDOMEN AND PELVIS OC IC   ORDERED BY: ANGEL LACY     PROCEDURE DATE:  2024          INTERPRETATION:  CLINICAL INFORMATION: Mass on recent colonoscopy.    COMPARISON: None.    CONTRAST/COMPLICATIONS:  IV Contrast: Isovue 370  90 cc administered   10 cc discarded  Oral Contrast: Gastroview  Complications: None reported at time of study completion    PROCEDURE:  CT of the Chest, Abdomen and Pelvis was performed.  Sagittal and coronal reformats were performed.    FINDINGS:  CHEST:  LUNGS AND LARGE AIRWAYS: Patent central airways. No pulmonary metastases.   3 mm calcified granuloma seen in the left lower lobe.  PLEURA: No pleural effusion.  VESSELS: Within normal limits. No central PE identified.  HEART: Heart size is normal. No pericardial effusion.  MEDIASTINUM AND RODRIGO: No lymphadenopathy.  CHEST WALL AND LOWER NECK: Enlarged left lobe thyroid containing at least   one 2.4 cm nodule. Probable right breast microclips.    ABDOMEN AND PELVIS:  LIVER: Nonenlarged. Mild fatty infiltration of liver. 1.1 cm hypodensity   in segment six and five of liver-probable cyst or biliary hamartoma.. 0.8   cm hypodensity in segment two of liver likely cyst or biliary hamartoma.  BILE DUCTS: Normal caliber.  GALLBLADDER: Within normal limits.  SPLEEN: Within normal limits.  PANCREAS: Within normal limits.  ADRENALS: Within normal limits.  KIDNEYS/URETERS: Normal size. No hydronephrosis. 4.2 cm cortical cyst   interpolar left kidney. 2.1 cm cortical cyst upper pole left kidney..    BLADDER: Within normal limits.  REPRODUCTIVE ORGANS: Anteverted fibroid uterus noted. The uterus measures   8.1 cm superoinferior dimension by 4.7 cm AP diameter by 5.3 cm diameter.   Unremarkable ovaries    BOWEL: The ingested oral contrast material has reached the mid transverse   colon. No bowel obstruction. Normal appendix. 2.5 x 3.7 x 2.4 cm polypoid   mass seen in the superior cecum. No definite extracolonic extension. 0.6   cm medial pericolic node. No bowel obstruction. Isolated colonic   diverticula.  PERITONEUM: No ascites.  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  RETROPERITONEUM/LYMPH NODES: No lymphadenopathy.  ABDOMINAL WALL: Within normal limits.  BONES: Degenerative disc disease L4-L5 and L5-S1. DISH involving thoracic   spine.    IMPRESSION:  Polypoid lesion seen in the superior cecum as described above.    No definitive evidence of metastases in the abdomen or chest.    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    --- End of Report ---            ANGELA REDDY MD; Attending Radiologist  This document has been electronically signed. Mar 25 2024  4:59PM

## 2024-03-26 NOTE — CONSULT NOTE ADULT - SUBJECTIVE AND OBJECTIVE BOX
HPI:  63 year old female with PMH HTN, Depression, Arthritis presents to [facility] ED referred by Dr. Flynn for 2 month history of abdominal pain in relation to a recently discovered poplyps in cecum and sigmoid colon.     Initial colonoscopy performed 24, Poor prep, scope advanced to cecum.  Large mass just distal to cecum occluding 2/3 lumen w/ cancerous appearance, multiple biopsies taken. Ascending, transverse and descending colon normal. Sigmoid colon noted large polyp on stalk, however not removed 2/2 poor prep. Small internal hemorrhoids noted. 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 There is no evidence of high grade dysplasia or malignancy. Pt admits to onset of intermittent lower abdominal pain two month ago, walks around to relieve pain. Admits to losing 25 lbs over the last year w/ change in diet (avoiding fried foods). Moving bowels every once a day, soft/formed, no blood in toilet bowel or when wiping. Not taking stool softeners or fiber supplement. Patient follows up with Pain Medicine specialists for her chronic joint pain due to her arthitis (oxycodone 30mg 3 times a week). Denies fever, chills, headache, nauea, vomiting, palpitations, bloody bowel movements, urinary symptoms, acute changes in bowel pattern.     In the ED, hypertensive to 162/97, HR wnl, afebrile, saturating appropriately on RA  On exam, abdomen soft, non distended, tender to palpation mildly towards RLQ, no rebound or guarding. Well healed Pfannestiel scar   Labs significant for Hb 10.6, rest wnl   CT scan with PO and IV contrast shows    (25 Mar 2024 17:47)      VASCULAR ADDENDUM  The patient is a 65 year old female with a PMHx of HTN, depression, neuropathy, and arthritis admitted for further workup of cecal and sigmoid polyps found on outpatient colonsocopy.  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     The patient states that she had had abdominal ongoing for several months, does not change with eating, however increases when she walks. She has had loose BMs, but no blood or melena. Endorses some nausea but no vomitting. She also endorses back pain for which she takes oxycodone for at home. She denies chest pain or shortness of breath. Has numbness and tingling in her hands, ongoing for months secondary to known neuropathy.     PMHx - HTN, depression, neuropathy, arthritis  PSHx - c section, knee surgery, lump  Meds - ASA, HCTZ, losartan, oxy, gabapentin, relistor,   Allergies - none  SoHx - 2 cig/week  FamHx- no bleeding or clotting disorders    Vitals wnl  Exam - abd soft/nontender, some fullnesss in midepigastric region.   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 1000 milliLiter(s) (120 mL/Hr) IV Continuous <Continuous>  pantoprazole  Injectable 40 milliGRAM(s) IV Push daily    MEDICATIONS  (PRN):  acetaminophen     Tablet .. 650 milliGRAM(s) Oral every 6 hours PRN Temp greater or equal to 38C (100.4F), Mild Pain (1 - 3), Moderate Pain (4 - 6)  ondansetron Injectable 4 milliGRAM(s) IV Push every 6 hours PRN Nausea      Allergies    No Known Allergies    Intolerances        SOCIAL HISTORY:    FAMILY HISTORY:  No pertinent family history in first degree relatives        Vital Signs Last 24 Hrs  T(C): 36.7 (26 Mar 2024 12:05), Max: 36.8 (25 Mar 2024 12:52)  T(F): 98 (26 Mar 2024 12:05), Max: 98.3 (25 Mar 2024 17:34)  HR: 61 (26 Mar 2024 12:34) (60 - 71)  BP: 170/83 (26 Mar 2024 12:34) (151/75 - 175/106)  BP(mean): --  RR: 18 (26 Mar 2024 12:05) (17 - 18)  SpO2: 99% (26 Mar 2024 12:05) (97% - 100%)    Parameters below as of 26 Mar 2024 12:05  Patient On (Oxygen Delivery Method): room air        PHYSICAL EXAM:   General: Patient is doing well and lying in bed comfortably  Constitutional: alert and oriented   Pulm: Nonlabored breathing, no respiratory distress  CV: Regular rate and rhythm, normal sinus rhythm  Abd: soft/nontender, some fullnesss in midepigastric region. No rebound, no guarding.   Extremities: warm, well perfused, no edema; distal pulses palpable    LABS:                        10.2   4.84  )-----------( 219      ( 26 Mar 2024 05:30 )             32.1     03-    138  |  103  |  13  ----------------------------<  95  3.9   |  26  |  0.89    Ca    9.3      26 Mar 2024 05:30  Phos  4.3     -  Mg     2.0           PT/INR - ( 25 Mar 2024 13:02 )   PT: 10.1 sec;   INR: 0.88          PTT - ( 25 Mar 2024 13:02 )  PTT:33.8 sec  Urinalysis Basic - ( 26 Mar 2024 05:30 )    Color: x / Appearance: x / SG: x / pH: x  Gluc: 95 mg/dL / Ketone: x  / Bili: x / Urobili: x   Blood: x / Protein: x / Nitrite: x   Leuk Esterase: x / RBC: x / WBC x   Sq Epi: x / Non Sq Epi: x / Bacteria: x        RADIOLOGY & ADDITIONAL STUDIES:    CT Abdomen and Pelvis w/ Oral Cont and w/ IV Cont:   ACC: [id number] EXAM:  CT CHEST IC   ORDERED BY: RACHEL CASEY     ACC: [id number] EXAM:  CT ABDOMEN AND PELVIS OC IC   ORDERED BY: ANGEL LACY     PROCEDURE DATE:  2024          INTERPRETATION:  CLINICAL INFORMATION: Mass on recent colonoscopy.    COMPARISON: None.    CONTRAST/COMPLICATIONS:  IV Contrast: Isovue 370  90 cc administered   10 cc discarded  Oral Contrast: Gastroview  Complications: None reported at time of study completion    PROCEDURE:  CT of the Chest, Abdomenand Pelvis was performed.  Sagittal and coronal reformats were performed.    FINDINGS:  CHEST:  LUNGS AND LARGE AIRWAYS: Patent central airways. No pulmonary metastases.   3 mm calcified granuloma seen in the left lower lobe.  PLEURA: No pleural effusion.  VESSELS: Within normal limits. No central PE identified.  HEART: Heart size is normal. No pericardial effusion.  MEDIASTINUM AND RODRIGO: No lymphadenopathy.  CHEST WALL AND LOWER NECK: Enlarged left lobe thyroid containing at least   one 2.4 cm nodule. Probable right breast microclips.    ABDOMEN AND PELVIS:  LIVER: Nonenlarged. Mild fatty infiltration of liver. 1.1 cm hypodensity   in segment six and five of liver-probable cyst or biliary hamartoma.. 0.8   cm hypodensity in segment two of liver likely cyst or biliary hamartoma.  BILE DUCTS: Normal caliber.  GALLBLADDER: Within normal limits.  SPLEEN: Within normal limits.  PANCREAS: Within normal limits.  ADRENALS: Within normal limits.  KIDNEYS/URETERS: Normal size. No hydronephrosis. 4.2 cm cortical cyst   interpolar left kidney. 2.1 cm cortical cyst upper pole left kidney..    BLADDER: Within normal limits.  REPRODUCTIVE ORGANS: Anteverted fibroid uterus noted. The uterus measures   8.1 cm superoinferior dimension by 4.7 cm AP diameter by 5.3 cm diameter.   Unremarkable ovaries    BOWEL: The ingested oral contrast material has reached the mid transverse   colon. No bowel obstruction. Normal appendix. 2.5 x 3.7 x 2.4 cm polypoid   mass seen in the superior cecum. No definite extracolonic extension. 0.6   cm medial pericolic node. No bowel obstruction. Isolated colonic   diverticula.  PERITONEUM: No ascites.  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  RETROPERITONEUM/LYMPH NODES: No lymphadenopathy.  ABDOMINAL WALL: Within normal limits.  BONES: Degenerative disc disease L4-L5 and L5-S1. DISH involving thoracic   spine.    IMPRESSION:  Polypoid lesion seen in the superior cecum as described above.    No definitive evidence of metastases in the abdomen or chest.    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    --- End of Report ---            ANGELA REDDY MD; Attending Radiologist  This document has been electronically signed. Mar 25 2024  4:59PM (24 @ 16:13)

## 2024-03-26 NOTE — CONSULT NOTE ADULT - ASSESSMENT
63F with PMhx HTN recently found a large mass just distal to cecum occluding 2/3 lumen with high concern for malignancy as well as a pedunculated sigmoid polyp pending colonoscopy and R hemicolectomy.    Stable at bedside. No clinical GIB.  Finished approx 1/2 golytely prep    CT chest/A/P done on admission without concern for underlying metastasis    Recommendations:  -F/u CEA  -Check iron studies; IV iron as needed  -Clear liquids today  -Finish golytely prep today  -Please give additional 1/2 dose golytely prep this evening  -NPO at midnight; tentatively scheduled for colonoscopy on 3/27 at 0730    Rodney Rico DO  Gastroenterology Fellow  Pager: [phone]  After 5PM or on weekends, please contact Eldridge Hill  for fellow on call   63F with PMhx HTN recently found a large mass just distal to cecum occluding 2/3 lumen with high concern for malignancy as well as a pedunculated sigmoid polyp pending colonoscopy and R hemicolectomy.    Stable at bedside. No clinical GIB.  Finished approx 1/2 golytely prep    CT chest/A/P done on admission without concern for underlying metastasis  2.5 x 3.7 x 2.4 cm polypoid  mass seen in the superior cecum    Recommendations:  -F/u CEA  -Check iron studies; IV iron as needed  -Clear liquids today  -Finish golytely prep today  -Please give additional 1/2 dose golytely prep this evening  -NPO at midnight; tentatively scheduled for colonoscopy on 3/27 at 0730    Rodney Rico DO  Gastroenterology Fellow  Pager: [phone]  After 5PM or on weekends, please contact Ethel Hill  for fellow on call

## 2024-03-26 NOTE — CONSULT NOTE ADULT - ASSESSMENT
The patient is a 65 year old female with a PMHx of HTN, depression, neuropathy, and arthritis admitted for further workup of cecal and sigmoid polyps found on outpatient colonsocopy.  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

## 2024-03-27 ENCOUNTER — RESULT REVIEW (OUTPATIENT)
Age: 64
End: 2024-03-27

## 2024-03-27 ENCOUNTER — TRANSCRIPTION ENCOUNTER (OUTPATIENT)
Age: 64
End: 2024-03-27

## 2024-03-27 LAB
ALBUMIN SERPL ELPH-MCNC: 3.3 G/DL — SIGNIFICANT CHANGE UP (ref 3.3–5)
ALP SERPL-CCNC: 64 U/L — SIGNIFICANT CHANGE UP (ref 40–120)
ALT FLD-CCNC: 12 U/L — SIGNIFICANT CHANGE UP (ref 10–45)
ANION GAP SERPL CALC-SCNC: 11 MMOL/L — SIGNIFICANT CHANGE UP (ref 5–17)
ANION GAP SERPL CALC-SCNC: 13 MMOL/L — SIGNIFICANT CHANGE UP (ref 5–17)
APTT BLD: 39.5 SEC — HIGH (ref 24.5–35.6)
AST SERPL-CCNC: 13 U/L — SIGNIFICANT CHANGE UP (ref 10–40)
BILIRUB DIRECT SERPL-MCNC: <0.2 MG/DL — SIGNIFICANT CHANGE UP (ref 0–0.3)
BILIRUB INDIRECT FLD-MCNC: SIGNIFICANT CHANGE UP MG/DL (ref 0.2–1)
BILIRUB SERPL-MCNC: 0.4 MG/DL — SIGNIFICANT CHANGE UP (ref 0.2–1.2)
BUN SERPL-MCNC: 4 MG/DL — LOW (ref 7–23)
BUN SERPL-MCNC: 5 MG/DL — LOW (ref 7–23)
CALCIUM SERPL-MCNC: 9.3 MG/DL — SIGNIFICANT CHANGE UP (ref 8.4–10.5)
CALCIUM SERPL-MCNC: 9.4 MG/DL — SIGNIFICANT CHANGE UP (ref 8.4–10.5)
CHLORIDE SERPL-SCNC: 104 MMOL/L — SIGNIFICANT CHANGE UP (ref 96–108)
CHLORIDE SERPL-SCNC: 107 MMOL/L — SIGNIFICANT CHANGE UP (ref 96–108)
CK MB CFR SERPL CALC: 1.4 NG/ML — SIGNIFICANT CHANGE UP (ref 0–6.7)
CK SERPL-CCNC: 68 U/L — SIGNIFICANT CHANGE UP (ref 25–170)
CO2 SERPL-SCNC: 21 MMOL/L — LOW (ref 22–31)
CO2 SERPL-SCNC: 22 MMOL/L — SIGNIFICANT CHANGE UP (ref 22–31)
CREAT SERPL-MCNC: 0.72 MG/DL — SIGNIFICANT CHANGE UP (ref 0.5–1.3)
CREAT SERPL-MCNC: 0.74 MG/DL — SIGNIFICANT CHANGE UP (ref 0.5–1.3)
EGFR: 91 ML/MIN/1.73M2 — SIGNIFICANT CHANGE UP
EGFR: 94 ML/MIN/1.73M2 — SIGNIFICANT CHANGE UP
GLUCOSE SERPL-MCNC: 146 MG/DL — HIGH (ref 70–99)
GLUCOSE SERPL-MCNC: 177 MG/DL — HIGH (ref 70–99)
HCT VFR BLD CALC: 26.8 % — LOW (ref 34.5–45)
HCT VFR BLD CALC: 31 % — LOW (ref 34.5–45)
HCT VFR BLD CALC: 32.2 % — LOW (ref 34.5–45)
HCT VFR BLD CALC: 32.7 % — LOW (ref 34.5–45)
HCT VFR BLD CALC: 32.7 % — LOW (ref 34.5–45)
HGB BLD-MCNC: 10.6 G/DL — LOW (ref 11.5–15.5)
HGB BLD-MCNC: 10.8 G/DL — LOW (ref 11.5–15.5)
HGB BLD-MCNC: 11.1 G/DL — LOW (ref 11.5–15.5)
HGB BLD-MCNC: 11.3 G/DL — LOW (ref 11.5–15.5)
HGB BLD-MCNC: 9.3 G/DL — LOW (ref 11.5–15.5)
INR BLD: 0.98 — SIGNIFICANT CHANGE UP (ref 0.85–1.18)
LACTATE SERPL-SCNC: 1.7 MMOL/L — SIGNIFICANT CHANGE UP (ref 0.5–2)
MAGNESIUM SERPL-MCNC: 1.6 MG/DL — SIGNIFICANT CHANGE UP (ref 1.6–2.6)
MCHC RBC-ENTMCNC: 31.1 PG — SIGNIFICANT CHANGE UP (ref 27–34)
MCHC RBC-ENTMCNC: 31.1 PG — SIGNIFICANT CHANGE UP (ref 27–34)
MCHC RBC-ENTMCNC: 31.9 PG — SIGNIFICANT CHANGE UP (ref 27–34)
MCHC RBC-ENTMCNC: 32 PG — SIGNIFICANT CHANGE UP (ref 27–34)
MCHC RBC-ENTMCNC: 32.3 PG — SIGNIFICANT CHANGE UP (ref 27–34)
MCHC RBC-ENTMCNC: 33.5 GM/DL — SIGNIFICANT CHANGE UP (ref 32–36)
MCHC RBC-ENTMCNC: 33.9 GM/DL — SIGNIFICANT CHANGE UP (ref 32–36)
MCHC RBC-ENTMCNC: 34.2 GM/DL — SIGNIFICANT CHANGE UP (ref 32–36)
MCHC RBC-ENTMCNC: 34.6 GM/DL — SIGNIFICANT CHANGE UP (ref 32–36)
MCHC RBC-ENTMCNC: 34.7 GM/DL — SIGNIFICANT CHANGE UP (ref 32–36)
MCV RBC AUTO: 90.1 FL — SIGNIFICANT CHANGE UP (ref 80–100)
MCV RBC AUTO: 91.6 FL — SIGNIFICANT CHANGE UP (ref 80–100)
MCV RBC AUTO: 92.1 FL — SIGNIFICANT CHANGE UP (ref 80–100)
MCV RBC AUTO: 94.5 FL — SIGNIFICANT CHANGE UP (ref 80–100)
MCV RBC AUTO: 95 FL — SIGNIFICANT CHANGE UP (ref 80–100)
NRBC # BLD: 0 /100 WBCS — SIGNIFICANT CHANGE UP (ref 0–0)
NT-PROBNP SERPL-SCNC: 375 PG/ML — HIGH (ref 0–300)
PHOSPHATE SERPL-MCNC: 4.4 MG/DL — SIGNIFICANT CHANGE UP (ref 2.5–4.5)
PLATELET # BLD AUTO: 148 K/UL — LOW (ref 150–400)
PLATELET # BLD AUTO: 168 K/UL — SIGNIFICANT CHANGE UP (ref 150–400)
PLATELET # BLD AUTO: 201 K/UL — SIGNIFICANT CHANGE UP (ref 150–400)
PLATELET # BLD AUTO: 203 K/UL — SIGNIFICANT CHANGE UP (ref 150–400)
PLATELET # BLD AUTO: 208 K/UL — SIGNIFICANT CHANGE UP (ref 150–400)
POTASSIUM SERPL-MCNC: 3.5 MMOL/L — SIGNIFICANT CHANGE UP (ref 3.5–5.3)
POTASSIUM SERPL-MCNC: 3.5 MMOL/L — SIGNIFICANT CHANGE UP (ref 3.5–5.3)
POTASSIUM SERPL-SCNC: 3.5 MMOL/L — SIGNIFICANT CHANGE UP (ref 3.5–5.3)
POTASSIUM SERPL-SCNC: 3.5 MMOL/L — SIGNIFICANT CHANGE UP (ref 3.5–5.3)
PROT SERPL-MCNC: 6 G/DL — SIGNIFICANT CHANGE UP (ref 6–8.3)
PROTHROM AB SERPL-ACNC: 11.2 SEC — SIGNIFICANT CHANGE UP (ref 9.5–13)
RBC # BLD: 2.91 M/UL — LOW (ref 3.8–5.2)
RBC # BLD: 3.28 M/UL — LOW (ref 3.8–5.2)
RBC # BLD: 3.39 M/UL — LOW (ref 3.8–5.2)
RBC # BLD: 3.57 M/UL — LOW (ref 3.8–5.2)
RBC # BLD: 3.63 M/UL — LOW (ref 3.8–5.2)
RBC # FLD: 13.3 % — SIGNIFICANT CHANGE UP (ref 10.3–14.5)
RBC # FLD: 13.4 % — SIGNIFICANT CHANGE UP (ref 10.3–14.5)
RBC # FLD: 13.5 % — SIGNIFICANT CHANGE UP (ref 10.3–14.5)
RBC # FLD: 14.8 % — HIGH (ref 10.3–14.5)
RBC # FLD: 15.4 % — HIGH (ref 10.3–14.5)
SODIUM SERPL-SCNC: 138 MMOL/L — SIGNIFICANT CHANGE UP (ref 135–145)
SODIUM SERPL-SCNC: 140 MMOL/L — SIGNIFICANT CHANGE UP (ref 135–145)
TROPONIN T, HIGH SENSITIVITY RESULT: <6 NG/L — SIGNIFICANT CHANGE UP (ref 0–51)
WBC # BLD: 10.45 K/UL — SIGNIFICANT CHANGE UP (ref 3.8–10.5)
WBC # BLD: 10.69 K/UL — HIGH (ref 3.8–10.5)
WBC # BLD: 12.53 K/UL — HIGH (ref 3.8–10.5)
WBC # BLD: 9.5 K/UL — SIGNIFICANT CHANGE UP (ref 3.8–10.5)
WBC # BLD: 9.86 K/UL — SIGNIFICANT CHANGE UP (ref 3.8–10.5)
WBC # FLD AUTO: 10.45 K/UL — SIGNIFICANT CHANGE UP (ref 3.8–10.5)
WBC # FLD AUTO: 10.69 K/UL — HIGH (ref 3.8–10.5)
WBC # FLD AUTO: 12.53 K/UL — HIGH (ref 3.8–10.5)
WBC # FLD AUTO: 9.5 K/UL — SIGNIFICANT CHANGE UP (ref 3.8–10.5)
WBC # FLD AUTO: 9.86 K/UL — SIGNIFICANT CHANGE UP (ref 3.8–10.5)

## 2024-03-27 PROCEDURE — 99223 1ST HOSP IP/OBS HIGH 75: CPT | Mod: 25

## 2024-03-27 PROCEDURE — 36245 INS CATH ABD/L-EXT ART 1ST: CPT | Mod: 59

## 2024-03-27 PROCEDURE — 88305 TISSUE EXAM BY PATHOLOGIST: CPT | Mod: 26,59

## 2024-03-27 PROCEDURE — 75726 ARTERY X-RAYS ABDOMEN: CPT | Mod: 26,59

## 2024-03-27 PROCEDURE — 45388 COLONOSCOPY W/ABLATION: CPT | Mod: 59

## 2024-03-27 PROCEDURE — 36246 INS CATH ABD/L-EXT ART 2ND: CPT

## 2024-03-27 PROCEDURE — 45390 COLONOSCOPY W/RESECTION: CPT | Mod: 59

## 2024-03-27 PROCEDURE — 75774 ARTERY X-RAY EACH VESSEL: CPT | Mod: 26

## 2024-03-27 PROCEDURE — 99232 SBSQ HOSP IP/OBS MODERATE 35: CPT | Mod: GC

## 2024-03-27 PROCEDURE — 99232 SBSQ HOSP IP/OBS MODERATE 35: CPT

## 2024-03-27 PROCEDURE — 36248 INS CATH ABD/L-EXT ART ADDL: CPT

## 2024-03-27 PROCEDURE — 93010 ELECTROCARDIOGRAM REPORT: CPT

## 2024-03-27 DEVICE — CLIP HEMO INSTINCT PLUS ENDOSCOPIC: Type: IMPLANTABLE DEVICE | Status: FUNCTIONAL

## 2024-03-27 RX ORDER — HYDRALAZINE HCL 50 MG
10 TABLET ORAL ONCE
Refills: 0 | Status: COMPLETED | OUTPATIENT
Start: 2024-03-27 | End: 2024-03-27

## 2024-03-27 RX ORDER — POTASSIUM CHLORIDE 20 MEQ
10 PACKET (EA) ORAL
Refills: 0 | Status: COMPLETED | OUTPATIENT
Start: 2024-03-27 | End: 2024-03-27

## 2024-03-27 RX ORDER — THIAMINE MONONITRATE (VIT B1) 100 MG
500 TABLET ORAL ONCE
Refills: 0 | Status: COMPLETED | OUTPATIENT
Start: 2024-03-27 | End: 2024-03-27

## 2024-03-27 RX ORDER — MAGNESIUM SULFATE 500 MG/ML
2 VIAL (ML) INJECTION ONCE
Refills: 0 | Status: COMPLETED | OUTPATIENT
Start: 2024-03-27 | End: 2024-03-27

## 2024-03-27 RX ORDER — NICOTINE POLACRILEX 2 MG
1 GUM BUCCAL DAILY
Refills: 0 | Status: DISCONTINUED | OUTPATIENT
Start: 2024-03-27 | End: 2024-03-28

## 2024-03-27 RX ORDER — GABAPENTIN 400 MG/1
300 CAPSULE ORAL EVERY 12 HOURS
Refills: 0 | Status: DISCONTINUED | OUTPATIENT
Start: 2024-03-27 | End: 2024-03-30

## 2024-03-27 RX ORDER — SODIUM CHLORIDE 9 MG/ML
500 INJECTION, SOLUTION INTRAVENOUS ONCE
Refills: 0 | Status: COMPLETED | OUTPATIENT
Start: 2024-03-27 | End: 2024-03-27

## 2024-03-27 RX ADMIN — Medication 10 MILLIGRAM(S): at 10:13

## 2024-03-27 RX ADMIN — Medication 100 MILLIEQUIVALENT(S): at 17:05

## 2024-03-27 RX ADMIN — HEPARIN SODIUM 7500 UNIT(S): 5000 INJECTION INTRAVENOUS; SUBCUTANEOUS at 06:10

## 2024-03-27 RX ADMIN — Medication 100 MILLIEQUIVALENT(S): at 14:04

## 2024-03-27 RX ADMIN — Medication 100 MILLIEQUIVALENT(S): at 18:29

## 2024-03-27 RX ADMIN — Medication 10 MILLIGRAM(S): at 11:01

## 2024-03-27 RX ADMIN — Medication 650 MILLIGRAM(S): at 05:23

## 2024-03-27 RX ADMIN — Medication 650 MILLIGRAM(S): at 04:23

## 2024-03-27 RX ADMIN — PANTOPRAZOLE SODIUM 40 MILLIGRAM(S): 20 TABLET, DELAYED RELEASE ORAL at 11:02

## 2024-03-27 RX ADMIN — Medication 25 GRAM(S): at 15:04

## 2024-03-27 RX ADMIN — SODIUM CHLORIDE 1000 MILLILITER(S): 9 INJECTION, SOLUTION INTRAVENOUS at 13:00

## 2024-03-27 RX ADMIN — SODIUM CHLORIDE 500 MILLILITER(S): 9 INJECTION, SOLUTION INTRAVENOUS at 15:05

## 2024-03-27 RX ADMIN — SODIUM CHLORIDE 120 MILLILITER(S): 9 INJECTION, SOLUTION INTRAVENOUS at 10:16

## 2024-03-27 RX ADMIN — Medication 105 MILLIGRAM(S): at 17:06

## 2024-03-27 RX ADMIN — GABAPENTIN 300 MILLIGRAM(S): 400 CAPSULE ORAL at 19:31

## 2024-03-27 NOTE — PRE-ANESTHESIA EVALUATION ADULT - NSANTHPMHFT_GEN_ALL_CORE
63F with PMHx HTN, Depression, Arthritis presents to [facility] ED referred by  Dr. Flynn for 2 month history of abdominal pain i/s/o recently discovered non obstructing mass just distal to cecum concerning for malignancy and pedunculated sigmoid polyp.

## 2024-03-27 NOTE — DIETITIAN INITIAL EVALUATION ADULT - OTHER INFO
63 year old female active smoker with PMH EtOH use, HTN, Depression, Arthritis with recently discovered cecal and sigmoid polyps with pathology consistent with tubulovillous adenoma admitted for endoscopic resection vs. R hemicolectomy now s/p colonoscopy with cecal and sigmoid polypectomy c/b brisk bleeding from cecal polyp stalk (8 clips placed) requiring 1U PRBC and intermittent phenylephrine transferred to SICU for HD monitoring.    Chart reviewed. Pt seen at bedside on 5 EA, on room air. Currently NPO, on IV LR. Confirms no known food allergies, food preferences obtained & entered into [employer]. Pt denies recent changes to appetite or intake, endorses good intake at baseline- has been trying to lose weight over past year through healthy eating [more fruits & vegetables, fish, less fried foods]. Pt denies recent weight gain, stated she has maybe "lost a few ounces." No overt muscle wasting/subcutaneous losses per NFPE. Pt reported few days of loose BM/diarrhea, no other s/s GI distress noted. Labs reviewed- bicarb 21 <L>. Meds- IV thiamine, protonix, KCl. RDN will continue to monitor, reassess, and intervene as appropriate.        Pain: no pain/discomfort noted  Skin: no pressure injuries noted; Chencho score 19  GI: soft, nontender/nondistended 63 year old female active smoker with PMH EtOH use, HTN, Depression, Arthritis with recently discovered cecal and sigmoid polyps with pathology consistent with tubulovillous adenoma admitted for endoscopic resection vs. R hemicolectomy now s/p colonoscopy with cecal and sigmoid polypectomy c/b brisk bleeding from cecal polyp stalk (8 clips placed) requiring 1U PRBC and intermittent phenylephrine transferred to SICU for HD monitoring.    Chart reviewed. Pt seen at bedside on 5 EA, on room air. Currently NPO, on IV LR. Confirms no known food allergies, food preferences obtained & entered into [employer]. Pt denies recent changes to appetite or intake, endorses good intake at baseline- has been trying to lose weight over past year through healthy eating [more fruits & vegetables, fish, less fried foods]. Pt denies recent weight loss, stated she has maybe "lost a few ounces." No overt muscle wasting/subcutaneous losses per NFPE. Pt reported few days of loose BM/diarrhea, no other s/s GI distress noted. Labs reviewed- bicarb 21 <L>. Meds- IV thiamine, protonix, KCl. RDN will continue to monitor, reassess, and intervene as appropriate.        Pain: no pain/discomfort noted  Skin: no pressure injuries noted; Chencho score 19  GI: soft, nontender/nondistended

## 2024-03-27 NOTE — CONSULT NOTE ADULT - SUBJECTIVE AND OBJECTIVE BOX
HPI:  63 year old female with PMH HTN, Depression, Arthritis presents to [facility] ED referred by Dr. Flynn for 2 month history of abdominal pain in relation to a recently discovered poplyps in cecum and sigmoid colon.     Initial colonoscopy performed 2/2/24, Poor prep, scope advanced to cecum.  Large mass just distal to cecum occluding 2/3 lumen w/ cancerous appearance, multiple biopsies taken. Ascending, transverse and descending colon normal. Sigmoid colon noted large polyp on stalk, however not removed 2/2 poor prep. Small internal hemorrhoids noted. 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 There is no evidence of high grade dysplasia or malignancy. Pt admits to onset of intermittent lower abdominal pain two month ago, walks around to relieve pain. Admits to losing 25 lbs over the last year w/ change in diet (avoiding fried foods). Moving bowels every once a day, soft/formed, no blood in toilet bowel or when wiping. Not taking stool softeners or fiber supplement. Patient follows up with Pain Medicine specialists for her chronic joint pain due to her arthitis (oxycodone 30mg 3 times a week). Denies fever, chills, headache, nauea, vomiting, palpitations, bloody bowel movements, urinary symptoms, acute changes in bowel pattern.     In the ED, hypertensive to 162/97, HR wnl, afebrile, saturating appropriately on RA  On exam, abdomen soft, non distended, tender to palpation mildly towards RLQ, no rebound or guarding. Well healed Pfannestiel scar   Labs significant for Hb 10.6, rest wnl   CT scan with PO and IV contrast shows    (25 Mar 2024 17:47)      SICU ADDENDUM:  63 year old female with PMH HTN, Depression, Arthritis with recently discovered cecal and sigmoid polyps with pathology consistent with tubulovillous adenoma admitted for endoscopic resection vs. R hemicolectomy now s/p colonoscopy with cecal and sigmoid polypectomy c/b brisk bleeding from cecal polyp stalk (8 clips placed) requiring 1U PRBC and intermittent phenylephrine transferred to SICU for HD monitoring. At this time, patient has no acute complaints, denies cp, sob, LOC, dizziness.      ICU Vital Signs Last 24 Hrs  T(F): 98.3 (03-27-24 @ 04:45), Max: 98.3 (03-27-24 @ 04:45)  HR: 75 (03-27-24 @ 07:27) (61 - 75)  BP: 160/74 (03-27-24 @ 07:27) (153/75 - 191/105)  BP(mean): --  ABP: --  RR: 17 (03-27-24 @ 07:27) (16 - 18)  SpO2: 98% (03-27-24 @ 07:27) (98% - 100%)    General: NAD, resting comfortably in bed. Well developed, well groomed  NEURO: AAOx3, CN II-XII grossly intact, EOMI, follows commands  HEENT: PERRL, MMM  CV: RRR, no MRG  PULM: CTAB, nonlabored breathing, no respiratory distress  ABD: soft, NT/ND.   EXTREM: WWP, no edema, no calf tenderness  VASC: No cyanosis, pallor. Palpable radial and PT bilaterally  SKIN: No rashes noted  PSYCH: Appropriate affect, answers questions appropriately    LABS:    03-26    138  |  103  |  13  ----------------------------<  95  3.9   |  26  |  0.89    Ca    9.3      26 Mar 2024 05:30  Phos  4.3     03-26  Mg     2.0     03-26                          10.2   4.84  )-----------( 219      ( 26 Mar 2024 05:30 )             32.1   PT/INR - ( 25 Mar 2024 13:02 )   PT: 10.1 sec;   INR: 0.88          PTT - ( 25 Mar 2024 13:02 )  PTT:33.8 sec  Urinalysis Basic - ( 26 Mar 2024 05:30 )               Pt BIBA presents with c/o lower back and bilateral hip pain since 11am, states hx of sickle cell. Pt states took 8mg Dilaudid at 11:00.

## 2024-03-27 NOTE — PROGRESS NOTE ADULT - ASSESSMENT
63F with PMHx HTN, Depression, Arthritis presents to [facility] ED referred by  Dr. Flynn for 2 month history of abdominal pain i/s/o recently discovered non obstructing mass just distal to cecum concerning for malignancy and pedunculated sigmoid polyp.     #Cecal Mass  #Pre-op  Patient underwent her initial colonoscopy on 02/02/24 and was found to have a non-obstructing cecal mass with poor prep, s/p biopsy revealing tubulovillous adenoma. Patient presenting with persistent abdominal pain for 2 months. CTA/P revealed. 2.5 x 3.7 x 2.4 cm polypoid mass seen in the superior cecum, no definite extracolonic extension. Patient is planned for repeat colonoscopy 03/27 for polyp removal and potential hemicolectomy if needed. Patient is an active smoker, 2-3 cigs/week and drinks ETOH, last drink 1 week ago. No history of ETOH withdrawal, seizures, DTs, or intubation. On ASA for primary prevention. No recent AC or NSAID use. EKG NSR. Denies chest pain, SOB or MENDOZA on ambulation or rest. ET 4-5 blocks, 4 flights of stairs with assistance from walker.   - RCRI Class I   - Chavez 0.4%  - METS >4 (ambulates with walker: ET 4-5 bocks, 4 flights of stairs)  - Continue to hold home ASA (for primary prevention, last dose 4 days ago)  - Offered Nicotine patch (smokes 2-3 cigarettes/week), however patient deferred  - Patient is low-risk for intermediate-risk procedure. No further cardiac work-up required at this time. Patient is medically optimized for upcoming colonoscopy/hemicolectomy  - S/p Colonoscopy 3/27: 7cm pedunculated and multilobular polyp in the cecum s/p snare polypectomy c/b acute bleeding s/p epinephrine injection and 8 endoclips. An additional single pedunculated 8 mm polyp of benign appearance was found in the sigmoid colon s/p polypectomy and 1 endoclip. Patient was transfused 1U pRBC given acute bleeding during the procedure and transferred to the SICU for monitoring.    #HTN  On admission, presented with -160/90. On home Losartan 50mg. Today, patient was hypertensive to  while off Losartan s/p Hydralazine 10mg IVPx1  - Continue home Losartan 50mg qd if patient remains hemodynamically stable  - If SBP >180, can give Hydralazine 10mg IVP (preferred over Labetalol given HR 60-65)    Medicine will continue to follow along with you. Discussed case with Dr. Burnett

## 2024-03-27 NOTE — DIETITIAN INITIAL EVALUATION ADULT - PERTINENT MEDS FT
MEDICATIONS  (STANDING):  gabapentin 300 milliGRAM(s) Oral every 12 hours  influenza   Vaccine 0.5 milliLiter(s) IntraMuscular once  lactated ringers Bolus 500 milliLiter(s) IV Bolus once  lactated ringers Bolus 500 milliLiter(s) IV Bolus once  magnesium sulfate  IVPB 2 Gram(s) IV Intermittent once  nicotine -  14 mG/24Hr(s) Patch 1 Patch Transdermal daily  pantoprazole  Injectable 40 milliGRAM(s) IV Push daily  potassium chloride  10 mEq/100 mL IVPB 10 milliEquivalent(s) IV Intermittent every 1 hour  thiamine IVPB 500 milliGRAM(s) IV Intermittent once    MEDICATIONS  (PRN):  ondansetron Injectable 4 milliGRAM(s) IV Push every 6 hours PRN Nausea

## 2024-03-27 NOTE — PRE-ANESTHESIA EVALUATION ADULT - NSANTHDIETYNSD_GEN_ALL_CORE
Yes
Yes
[Follow-Up: _____] : a [unfilled] follow-up visit 
[Patient] : patient
[Family Member] : family member

## 2024-03-27 NOTE — PROGRESS NOTE ADULT - SUBJECTIVE AND OBJECTIVE BOX
INTERVAL HPI/OVERNIGHT EVENTS:  No acute events overnight.     SUBJECTIVE: Patient seen and examined at bedside, resting comfortably in bed, and does not appear to be in any acute distress. Patient had already underwent colonoscopy this morning.     MEDICATIONS  (STANDING):  gabapentin 300 milliGRAM(s) Oral every 12 hours  influenza   Vaccine 0.5 milliLiter(s) IntraMuscular once  lactated ringers Bolus 500 milliLiter(s) IV Bolus once  lactated ringers Bolus 500 milliLiter(s) IV Bolus once  magnesium sulfate  IVPB 2 Gram(s) IV Intermittent once  nicotine -  14 mG/24Hr(s) Patch 1 Patch Transdermal daily  pantoprazole  Injectable 40 milliGRAM(s) IV Push daily  potassium chloride  10 mEq/100 mL IVPB 10 milliEquivalent(s) IV Intermittent every 1 hour  thiamine IVPB 500 milliGRAM(s) IV Intermittent once    MEDICATIONS  (PRN):  ondansetron Injectable 4 milliGRAM(s) IV Push every 6 hours PRN Nausea      Vital Signs Last 24 Hrs  T(C): 36.9 (27 Mar 2024 12:00), Max: 36.9 (27 Mar 2024 12:00)  T(F): 98.5 (27 Mar 2024 12:00), Max: 98.5 (27 Mar 2024 12:00)  HR: 104 (27 Mar 2024 12:00) (63 - 104)  BP: 154/91 (27 Mar 2024 12:00) (153/75 - 193/95)  BP(mean): 115 (27 Mar 2024 12:00) (115 - 132)  RR: 28 (27 Mar 2024 12:00) (16 - 28)  SpO2: 100% (27 Mar 2024 12:00) (98% - 100%)    Parameters below as of 27 Mar 2024 12:00  Patient On (Oxygen Delivery Method): nasal cannula  O2 Flow (L/min): 2      PHYSICAL EXAM:  General: in no acute distress  Eyes: EOMI intact bilaterally. Anicteric sclerae, moist conjunctivae  HENT: Moist mucous membranes  Neck: Trachea midline, supple  Lungs: CTA B/L. No wheezes, rales, or rhonchi  Cardiovascular: RRR. No murmurs, rubs, or gallops  Abdomen: Soft, non-tender non-distended; No rebound or guarding  Extremities: WWP, No clubbing, cyanosis or edema  Neurological: Alert and oriented  Skin: Warm and dry. No obvious rash     LABS:                        9.3    10.45 )-----------( 201      ( 27 Mar 2024 14:00 )             26.8     03-27    138  |  104  |  4<L>  ----------------------------<  177<H>  3.5   |  21<L>  |  0.72    Ca    9.4      27 Mar 2024 12:51  Phos  4.4     03-27  Mg     1.6     03-27    TPro  6.0  /  Alb  3.3  /  TBili  0.4  /  DBili  <0.2  /  AST  13  /  ALT  12  /  AlkPhos  64  03-27    PT/INR - ( 27 Mar 2024 09:55 )   PT: 11.2 sec;   INR: 0.98          PTT - ( 27 Mar 2024 09:55 )  PTT:39.5 sec  Urinalysis Basic - ( 27 Mar 2024 12:51 )    Color: x / Appearance: x / SG: x / pH: x  Gluc: 177 mg/dL / Ketone: x  / Bili: x / Urobili: x   Blood: x / Protein: x / Nitrite: x   Leuk Esterase: x / RBC: x / WBC x   Sq Epi: x / Non Sq Epi: x / Bacteria: x        MICROBIOLOGY:    RADIOLOGY & ADDITIONAL STUDIES:

## 2024-03-27 NOTE — CONSULT NOTE ADULT - ATTENDING COMMENTS
63 female active smoker, colonoscopy with bx c/w tubulovillous adenoma p/w abdominal pain, went for endoscopic resection of cecal and sigmoid polyps which was complicated by bleeding which was controlled endoscopically with clip and epi and received 1 u pRBC. Admitted to SICU for close monitoring.  2 large bore IVs in place  active T&S  trend H/H  holding SQH at this time and placing SCDs for DVT ppx    decision making high complexity
63F with PMHx HTN, Depression, Arthritis ( sp Bilateral knee surgery ) active smoker, with hx of ETOH use last drink about one week ago, presents to [facility] ED referred by  Dr. Flynn for 2 month history of abdominal pain i/s/o recently discovered non obstructing mass just distal to cecum concerning for malignancy and pedunculated sigmoid polyp.     - pt seen and examined with Dr. Madera  BMI - 37.9   obese, use rollator , live at 5th floor walk up apartment, able to walk up.  RRR, good air entry bilaterally  bs present, soft, nt, nd.  ext- no edema, no tenderness  neuro- aao x 3, non focal.  labs reviewed.     - preop - pt is at low risk for low to intermediate risk procedure -no further work up required.  incentive spirometer  -BMI - 37.9   - ASA- last taken 4 days ago.  - Losartan - can give today, to hold on day of procedure.  - hx of ETOH use- last drink one week ago, no prior withdrawal symptoms. to monitor  - active smoker- not ready to quit, stated she is cutting down - smoking cessation counselled.  no hx of asthma no inhaler use.     Thank you for allowing medicine to participate in the care, will follow.
Recommendations:  -F/u CEA  -Check iron studies; IV iron as needed  -Clear liquids today  -Finish golytely prep today  -Please give additional 1/2 dose golytely prep this evening  -NPO at midnight; tentatively scheduled for colonoscopy on 3/27 at 0730

## 2024-03-27 NOTE — DIETITIAN INITIAL EVALUATION ADULT - OTHER CALCULATIONS
Ideal body weight (56.6kg) used for calculations as pt >120% IBW per [facility] Standards of Care. Needs estimated for age and adjusted for current clinical status, increased needs for clinical status. Fluid needs per team*

## 2024-03-27 NOTE — CONSULT NOTE ADULT - ASSESSMENT
63 year old female active smoker with PMH EtOH use, HTN, Depression, Arthritis with recently discovered cecal and sigmoid polyps with pathology consistent with tubulovillous adenoma admitted for endoscopic resection vs. R hemicolectomy now s/p colonoscopy with cecal and sigmoid polypectomy c/b brisk bleeding from cecal polyp stalk (8 clips placed) requiring 1U PRBC and intermittent phenylephrine transferred to SICU for HD monitoring.    Neuro: AOx3. Tylenol for pain PRN, zofran prn for nausea. Chronic pain: continue gabapentin, holding home oxycodone 30mg 3 times/week as pain currently well-controlled.  CV: MAP goal > 65. Required intermittent phenylepherine during however hypertensive to 190s on arrival requiring hydralazine x2. HTN: holding home losartan and HCTZ in shai-procedural setting. Holding ASA in setting of GI bleed.  Pulm: on RA  GI: NPO. Holding home methylnaltrexone 150mg TID in setting of GI bleed.  : voids.   ID: no issues  Endo: ISS   PPx: SCD, holding HSQ in setting of GI bleed  Lines: L radial A line, PIVs  PT/OT: not indicated  Dispo: SICU

## 2024-03-27 NOTE — CHART NOTE - NSCHARTNOTEFT_GEN_A_CORE
63F with PMH of HTN, depression, arthritis, sent in to [facility] ED by Dr. Flynn for 2 month history of abdominal pain and recently discovery of large sigmoid colon and cecal polyps.     In the ED, hemodynamically stable.     Labs largely unremarkable aside from anemia and as follows:   WBC 5.13, H/H 10.6/32.9, MCV 32.9, plt 221, INR 0.88, and Cr 0.79.     CT abdomen/pelvis w/oral and IV contrast:   - The ingested oral contrast material has reached the mid transverse colon. No bowel obstruction. Normal appendix. 2.5 x 3.7 x 2.4 cm polypoid mass seen in the superior cecum. No definite extracolonic extension. 0.6 cm medial pericolic node. No bowel obstruction. Isolated colonic diverticula.  - No definitive evidence of metastases in the abdomen or chest.  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    Planned for colonoscopy on Wednesday with Dr. Milner to remove polypoid mass. Per surgery team, patient is chronically constipated and has had to receive methylnaltrexone in the past due to opiate use.     Recommendations:   - plan for colonoscopy and polypoid mass resection on Wednesday (02/27) with Dr. Milner  - clear liquid diet starting today   - start 4L Golytely bowel prep given patient is constipated and will plan for 2-day prep   - NPO except medications after midnight on Tuesday  - add on CMP to check LFTs   - send tumor markers     GI will formally see as a consult tomorrow.     Sakina Arias D.O.   Gastroenterology Fellow  Weekday 7am-5pm Pager: [phone]  Weeknights/Weekend/Holiday Coverage: Please call the  for contact info
Colonoscopy performed with Dr. Coon and Dr. Milner    Findings/Procedure note as follows:    -A 7 cm pedunculated and multilobular polyp appears to be arising from cecum. The base of the stalk was lifted adequately using methylene blue and saline. Piecemeal snare polypectomy was performed.   -There was bleeding at the site of the polypectomy. Visualization was limited due to acute bleeding with looping, requiring change in position and resuscitation.   -After extensive irrigation and removal of clots, the site was identified with active bleeding. 2.5 mL of epinephrine 1/94991 was injected with partial hemostasis. One endoclip was placed. Residual tissue was resected. Site was ablated with hot biopsy forcep using soft coag. A total of 8 endoclips were placed with complete hemostasis.  -A single pedunculated 8 mm polyp of benign appearance was found in the sigmoid colon. A single-piece polypectomy was performed using a hot snare over a saline pillow in the sigmoid colon. The polyp was completely removed. One endo clip was applied.    Recommendations:  -Transfer to SICU  -Repeat CBC; Maintain active T&S  -Transfuse as indicated  -Keep NPO: Trend CBC.  If Hgb stable later today, OK to advance to clear liquids  -IVF per SICU  -Residual hematochezia is expected but should taper in quantity.    Rodney Rico,   Gastroenterology Fellow  Pager: [phone]  After 5PM or on weekends, please contact [facility]  for fellow on call

## 2024-03-27 NOTE — PRE-ANESTHESIA EVALUATION ADULT - NSANTHPEFT_GEN_ALL_CORE
General: AAOx3, NAD  Eyes: The sclera and conjunctiva normal, pupils equal in size.  ENT: The ears and nose were normal in appearance; oropharynx clear, moist mucus membranes.  Neck: The appearance of the neck was normal, with no gross masses or nodules.  Respiratory: Unlabored, no retractions.  Neurological: No focal deficit, moves all extremities.  Exercise Tolerance:  METS>4.
General: AAOx3, NAD  Eyes: The sclera and conjunctiva normal, pupils equal in size.  ENT: The ears and nose were normal in appearance; oropharynx clear, moist mucus membranes.  Neck: The appearance of the neck was normal, with no gross masses or nodules.  Respiratory: Unlabored, no retractions.  CV: RRR  Neurological: No focal deficit, moves all extremities.  Exercise Tolerance:  METS>4.

## 2024-03-27 NOTE — PROGRESS NOTE ADULT - SUBJECTIVE AND OBJECTIVE BOX
INTERVAL HPI/OVERNIGHT EVENTS:  -191, hydral x2 given. finished golytely prep + additional 1/2 recommended by GI, BMs clear    SUBJECTIVE:  pt seen at bedside, feeling okay. BMs are clear    MEDICATIONS  (STANDING):  heparin   Injectable 7500 Unit(s) SubCutaneous every 8 hours  influenza   Vaccine 0.5 milliLiter(s) IntraMuscular once  lactated ringers. 1000 milliLiter(s) (120 mL/Hr) IV Continuous <Continuous>  pantoprazole  Injectable 40 milliGRAM(s) IV Push daily    MEDICATIONS  (PRN):  acetaminophen     Tablet .. 650 milliGRAM(s) Oral every 6 hours PRN Temp greater or equal to 38C (100.4F), Mild Pain (1 - 3), Moderate Pain (4 - 6)  ondansetron Injectable 4 milliGRAM(s) IV Push every 6 hours PRN Nausea      Vital Signs Last 24 Hrs  T(C): 36.8 (27 Mar 2024 07:27), Max: 36.8 (26 Mar 2024 16:09)  T(F): 98.3 (27 Mar 2024 04:45), Max: 98.3 (27 Mar 2024 04:45)  HR: 75 (27 Mar 2024 07:27) (61 - 75)  BP: 160/74 (27 Mar 2024 07:27) (153/75 - 191/105)  BP(mean): --  RR: 17 (27 Mar 2024 07:27) (16 - 18)  SpO2: 98% (27 Mar 2024 07:27) (97% - 100%)    Parameters below as of 27 Mar 2024 04:45  Patient On (Oxygen Delivery Method): room air        PHYSICAL EXAM:      Constitutional: A&Ox3    Respiratory: non labored breathing, no respiratory distress    Cardiovascular: NSR, RRR    Gastrointestinal: soft, nontender, nondistended    Extremities: (-) edema                  I&O's Detail    26 Mar 2024 07:01  -  27 Mar 2024 07:00  --------------------------------------------------------  IN:    Lactated Ringers: 1080 mL  Total IN: 1080 mL    OUT:  Total OUT: 0 mL    Total NET: 1080 mL          LABS:                        10.2   4.84  )-----------( 219      ( 26 Mar 2024 05:30 )             32.1     03-26    138  |  103  |  13  ----------------------------<  95  3.9   |  26  |  0.89    Ca    9.3      26 Mar 2024 05:30  Phos  4.3     03-26  Mg     2.0     03-26      PT/INR - ( 25 Mar 2024 13:02 )   PT: 10.1 sec;   INR: 0.88          PTT - ( 25 Mar 2024 13:02 )  PTT:33.8 sec  Urinalysis Basic - ( 26 Mar 2024 05:30 )    Color: x / Appearance: x / SG: x / pH: x  Gluc: 95 mg/dL / Ketone: x  / Bili: x / Urobili: x   Blood: x / Protein: x / Nitrite: x   Leuk Esterase: x / RBC: x / WBC x   Sq Epi: x / Non Sq Epi: x / Bacteria: x        RADIOLOGY & ADDITIONAL STUDIES:

## 2024-03-27 NOTE — DIETITIAN INITIAL EVALUATION ADULT - PERTINENT LABORATORY DATA
03-27    138  |  104  |  4<L>  ----------------------------<  177<H>  3.5   |  21<L>  |  0.72    Ca    9.4      27 Mar 2024 12:51  Phos  4.4     03-27  Mg     1.6     03-27    TPro  6.0  /  Alb  3.3  /  TBili  0.4  /  DBili  <0.2  /  AST  13  /  ALT  12  /  AlkPhos  64  03-27

## 2024-03-27 NOTE — DIETITIAN INITIAL EVALUATION ADULT - ADD RECOMMEND
1. Initiate nutrition as soon as medically feasible  - if PO, recommend regular PO diet- texture per team/SLP  - monitor intake & tolerance closely, consider oral nutrition supplement if PO intake suboptimal to meet needs  2. If unable to initiate PO/anticipated NPO 5+ days, consider TPN for temporary means to nutrition  3. Hydration per team  4. Bowel regimen per team  5. Monitor chemistry, GI function, skin integrity  6. Pain & bowel regimen per team

## 2024-03-27 NOTE — PROGRESS NOTE ADULT - ASSESSMENT
63 year old female with PMH HTN, Depression, Arthritis presents to [facility] ED referred by Dr. Flynn for 2 month history of abdominal pain in relation to recently discovered poplyps in cecum and sigmoid colon.     CLD/golytely/NPO and mIVF @mn  Pain/nausea control PRN   OR 3/28 for R hemicolectomy   Advanced GI eval for colonoscopy 3/27  Preoperative risk stratification   SCDs/OOBA/SQH   colonoscopy today

## 2024-03-28 ENCOUNTER — APPOINTMENT (OUTPATIENT)
Dept: COLORECTAL SURGERY | Facility: HOSPITAL | Age: 64
End: 2024-03-28

## 2024-03-28 LAB
ANION GAP SERPL CALC-SCNC: 5 MMOL/L — SIGNIFICANT CHANGE UP (ref 5–17)
ANION GAP SERPL CALC-SCNC: 9 MMOL/L — SIGNIFICANT CHANGE UP (ref 5–17)
APPEARANCE UR: CLEAR — SIGNIFICANT CHANGE UP
BACTERIA # UR AUTO: NEGATIVE /HPF — SIGNIFICANT CHANGE UP
BILIRUB UR-MCNC: NEGATIVE — SIGNIFICANT CHANGE UP
BUN SERPL-MCNC: 10 MG/DL — SIGNIFICANT CHANGE UP (ref 7–23)
BUN SERPL-MCNC: 8 MG/DL — SIGNIFICANT CHANGE UP (ref 7–23)
CALCIUM SERPL-MCNC: 8.4 MG/DL — SIGNIFICANT CHANGE UP (ref 8.4–10.5)
CALCIUM SERPL-MCNC: 8.9 MG/DL — SIGNIFICANT CHANGE UP (ref 8.4–10.5)
CAST: 0 /LPF — SIGNIFICANT CHANGE UP (ref 0–4)
CHLORIDE SERPL-SCNC: 106 MMOL/L — SIGNIFICANT CHANGE UP (ref 96–108)
CHLORIDE SERPL-SCNC: 108 MMOL/L — SIGNIFICANT CHANGE UP (ref 96–108)
CO2 SERPL-SCNC: 22 MMOL/L — SIGNIFICANT CHANGE UP (ref 22–31)
CO2 SERPL-SCNC: 24 MMOL/L — SIGNIFICANT CHANGE UP (ref 22–31)
COLOR SPEC: YELLOW — SIGNIFICANT CHANGE UP
CREAT ?TM UR-MCNC: 84 MG/DL — SIGNIFICANT CHANGE UP
CREAT SERPL-MCNC: 0.89 MG/DL — SIGNIFICANT CHANGE UP (ref 0.5–1.3)
CREAT SERPL-MCNC: 0.93 MG/DL — SIGNIFICANT CHANGE UP (ref 0.5–1.3)
DIFF PNL FLD: ABNORMAL
EGFR: 69 ML/MIN/1.73M2 — SIGNIFICANT CHANGE UP
EGFR: 73 ML/MIN/1.73M2 — SIGNIFICANT CHANGE UP
GLUCOSE SERPL-MCNC: 101 MG/DL — HIGH (ref 70–99)
GLUCOSE SERPL-MCNC: 98 MG/DL — SIGNIFICANT CHANGE UP (ref 70–99)
GLUCOSE UR QL: NEGATIVE MG/DL — SIGNIFICANT CHANGE UP
HCT VFR BLD CALC: 26.2 % — LOW (ref 34.5–45)
HCT VFR BLD CALC: 26.9 % — LOW (ref 34.5–45)
HCT VFR BLD CALC: 27.7 % — LOW (ref 34.5–45)
HCT VFR BLD CALC: 30.6 % — LOW (ref 34.5–45)
HGB BLD-MCNC: 10.7 G/DL — LOW (ref 11.5–15.5)
HGB BLD-MCNC: 9 G/DL — LOW (ref 11.5–15.5)
HGB BLD-MCNC: 9 G/DL — LOW (ref 11.5–15.5)
HGB BLD-MCNC: 9.6 G/DL — LOW (ref 11.5–15.5)
KETONES UR-MCNC: 15 MG/DL
LEUKOCYTE ESTERASE UR-ACNC: ABNORMAL
MAGNESIUM SERPL-MCNC: 2 MG/DL — SIGNIFICANT CHANGE UP (ref 1.6–2.6)
MCHC RBC-ENTMCNC: 30.8 PG — SIGNIFICANT CHANGE UP (ref 27–34)
MCHC RBC-ENTMCNC: 30.9 PG — SIGNIFICANT CHANGE UP (ref 27–34)
MCHC RBC-ENTMCNC: 31 PG — SIGNIFICANT CHANGE UP (ref 27–34)
MCHC RBC-ENTMCNC: 31.3 PG — SIGNIFICANT CHANGE UP (ref 27–34)
MCHC RBC-ENTMCNC: 33.5 GM/DL — SIGNIFICANT CHANGE UP (ref 32–36)
MCHC RBC-ENTMCNC: 34.4 GM/DL — SIGNIFICANT CHANGE UP (ref 32–36)
MCHC RBC-ENTMCNC: 34.7 GM/DL — SIGNIFICANT CHANGE UP (ref 32–36)
MCHC RBC-ENTMCNC: 35 GM/DL — SIGNIFICANT CHANGE UP (ref 32–36)
MCV RBC AUTO: 88.4 FL — SIGNIFICANT CHANGE UP (ref 80–100)
MCV RBC AUTO: 89.4 FL — SIGNIFICANT CHANGE UP (ref 80–100)
MCV RBC AUTO: 91 FL — SIGNIFICANT CHANGE UP (ref 80–100)
MCV RBC AUTO: 92.1 FL — SIGNIFICANT CHANGE UP (ref 80–100)
NITRITE UR-MCNC: NEGATIVE — SIGNIFICANT CHANGE UP
NRBC # BLD: 0 /100 WBCS — SIGNIFICANT CHANGE UP (ref 0–0)
OSMOLALITY UR: 438 MOSM/KG — SIGNIFICANT CHANGE UP (ref 300–900)
OSMOLALITY UR: 689 MOSM/KG — SIGNIFICANT CHANGE UP (ref 300–900)
PH UR: 6.5 — SIGNIFICANT CHANGE UP (ref 5–8)
PHOSPHATE SERPL-MCNC: 4.2 MG/DL — SIGNIFICANT CHANGE UP (ref 2.5–4.5)
PLATELET # BLD AUTO: 136 K/UL — LOW (ref 150–400)
PLATELET # BLD AUTO: 147 K/UL — LOW (ref 150–400)
PLATELET # BLD AUTO: 151 K/UL — SIGNIFICANT CHANGE UP (ref 150–400)
PLATELET # BLD AUTO: 162 K/UL — SIGNIFICANT CHANGE UP (ref 150–400)
POTASSIUM SERPL-MCNC: 3.7 MMOL/L — SIGNIFICANT CHANGE UP (ref 3.5–5.3)
POTASSIUM SERPL-MCNC: 4 MMOL/L — SIGNIFICANT CHANGE UP (ref 3.5–5.3)
POTASSIUM SERPL-SCNC: 3.7 MMOL/L — SIGNIFICANT CHANGE UP (ref 3.5–5.3)
POTASSIUM SERPL-SCNC: 4 MMOL/L — SIGNIFICANT CHANGE UP (ref 3.5–5.3)
POTASSIUM UR-SCNC: 24 MMOL/L — SIGNIFICANT CHANGE UP
PROT ?TM UR-MCNC: 15 MG/DL — HIGH (ref 0–12)
PROT UR-MCNC: 30 MG/DL
PROT/CREAT UR-RTO: 0.2 RATIO — SIGNIFICANT CHANGE UP (ref 0–0.2)
RBC # BLD: 2.88 M/UL — LOW (ref 3.8–5.2)
RBC # BLD: 2.92 M/UL — LOW (ref 3.8–5.2)
RBC # BLD: 3.1 M/UL — LOW (ref 3.8–5.2)
RBC # BLD: 3.46 M/UL — LOW (ref 3.8–5.2)
RBC # FLD: 15.9 % — HIGH (ref 10.3–14.5)
RBC # FLD: 16 % — HIGH (ref 10.3–14.5)
RBC CASTS # UR COMP ASSIST: 35 /HPF — HIGH (ref 0–4)
SODIUM SERPL-SCNC: 135 MMOL/L — SIGNIFICANT CHANGE UP (ref 135–145)
SODIUM SERPL-SCNC: 139 MMOL/L — SIGNIFICANT CHANGE UP (ref 135–145)
SODIUM UR-SCNC: 121 MMOL/L — SIGNIFICANT CHANGE UP
SP GR SPEC: >1.03 — HIGH (ref 1–1.03)
SQUAMOUS # UR AUTO: 2 /HPF — SIGNIFICANT CHANGE UP (ref 0–5)
SURGICAL PATHOLOGY STUDY: SIGNIFICANT CHANGE UP
UROBILINOGEN FLD QL: 1 MG/DL — SIGNIFICANT CHANGE UP (ref 0.2–1)
UUN UR-MCNC: 374 MG/DL — SIGNIFICANT CHANGE UP
WBC # BLD: 10.3 K/UL — SIGNIFICANT CHANGE UP (ref 3.8–10.5)
WBC # BLD: 11.55 K/UL — HIGH (ref 3.8–10.5)
WBC # BLD: 9.63 K/UL — SIGNIFICANT CHANGE UP (ref 3.8–10.5)
WBC # BLD: 9.74 K/UL — SIGNIFICANT CHANGE UP (ref 3.8–10.5)
WBC # FLD AUTO: 10.3 K/UL — SIGNIFICANT CHANGE UP (ref 3.8–10.5)
WBC # FLD AUTO: 11.55 K/UL — HIGH (ref 3.8–10.5)
WBC # FLD AUTO: 9.63 K/UL — SIGNIFICANT CHANGE UP (ref 3.8–10.5)
WBC # FLD AUTO: 9.74 K/UL — SIGNIFICANT CHANGE UP (ref 3.8–10.5)
WBC UR QL: 17 /HPF — HIGH (ref 0–5)

## 2024-03-28 PROCEDURE — 99232 SBSQ HOSP IP/OBS MODERATE 35: CPT | Mod: GC

## 2024-03-28 PROCEDURE — 99233 SBSQ HOSP IP/OBS HIGH 50: CPT

## 2024-03-28 RX ORDER — CHLORHEXIDINE GLUCONATE 213 G/1000ML
1 SOLUTION TOPICAL
Refills: 0 | Status: DISCONTINUED | OUTPATIENT
Start: 2024-03-28 | End: 2024-03-30

## 2024-03-28 RX ORDER — ACETAMINOPHEN 500 MG
1000 TABLET ORAL ONCE
Refills: 0 | Status: COMPLETED | OUTPATIENT
Start: 2024-03-28 | End: 2024-03-28

## 2024-03-28 RX ORDER — POTASSIUM CHLORIDE 20 MEQ
10 PACKET (EA) ORAL
Refills: 0 | Status: COMPLETED | OUTPATIENT
Start: 2024-03-28 | End: 2024-03-28

## 2024-03-28 RX ORDER — SODIUM CHLORIDE 9 MG/ML
1000 INJECTION, SOLUTION INTRAVENOUS
Refills: 0 | Status: DISCONTINUED | OUTPATIENT
Start: 2024-03-28 | End: 2024-03-28

## 2024-03-28 RX ORDER — SODIUM CHLORIDE 9 MG/ML
500 INJECTION, SOLUTION INTRAVENOUS ONCE
Refills: 0 | Status: COMPLETED | OUTPATIENT
Start: 2024-03-28 | End: 2024-03-28

## 2024-03-28 RX ADMIN — PANTOPRAZOLE SODIUM 40 MILLIGRAM(S): 20 TABLET, DELAYED RELEASE ORAL at 11:20

## 2024-03-28 RX ADMIN — Medication 100 MILLIEQUIVALENT(S): at 09:43

## 2024-03-28 RX ADMIN — GABAPENTIN 300 MILLIGRAM(S): 400 CAPSULE ORAL at 18:49

## 2024-03-28 RX ADMIN — Medication 100 MILLIEQUIVALENT(S): at 08:45

## 2024-03-28 RX ADMIN — Medication 1000 MILLIGRAM(S): at 02:29

## 2024-03-28 RX ADMIN — SODIUM CHLORIDE 1000 MILLILITER(S): 9 INJECTION, SOLUTION INTRAVENOUS at 05:25

## 2024-03-28 RX ADMIN — GABAPENTIN 300 MILLIGRAM(S): 400 CAPSULE ORAL at 05:54

## 2024-03-28 RX ADMIN — Medication 100 MILLIEQUIVALENT(S): at 07:24

## 2024-03-28 RX ADMIN — SODIUM CHLORIDE 110 MILLILITER(S): 9 INJECTION, SOLUTION INTRAVENOUS at 05:18

## 2024-03-28 RX ADMIN — Medication 400 MILLIGRAM(S): at 01:29

## 2024-03-28 NOTE — PROGRESS NOTE ADULT - ASSESSMENT
63 year old female active smoker with PMH EtOH use, HTN, Depression, Arthritis with recently discovered cecal and sigmoid polyps with pathology consistent with tubulovillous adenoma admitted for endoscopic resection vs. R hemicolectomy now s/p colonoscopy with cecal and sigmoid polypectomy c/b brisk bleeding from cecal polyp stalk (8 clips placed) requiring 1U PRBC and intermittent phenylephrine, now s/p IR angio with no active blush, transferred to SICU for hemodynamic monitoring.  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

## 2024-03-28 NOTE — PROGRESS NOTE ADULT - SUBJECTIVE AND OBJECTIVE BOX
24 hr events:  O/N: Urine output low this am, given 500cc bolus. No bloody BMs overnight.   3/27: s/p cecal and sigmoid polypectomy c/b brisk bleed from cecal polypectomy requiring 1U PRBC and intermittent phenylepheirne, transferred to SICU for HD monitoring, tachy 130s w/ crampy abd pain; EKG + CBC, CBC H/H 10.8 (10.6), EKG w/ poss new RBBB; trops + CKMB + pBNP sent. 500cc bolus. tachy 140s BP90s/50s; STAT CBC + 2uPRBC, H/H 9.3 (10.8), 2uPRBC transfused, STAT IR for embolization w/ no active bleed found, 1x large bloody BM upon return from IR     SUBJECTIVE: Patient seen at bedside in no acute distress. No CP, SOB, fevers or chills    Vital Signs Last 24 Hrs  T(C): 37.1 (27 Mar 2024 22:11), Max: 37.1 (27 Mar 2024 22:11)  T(F): 98.7 (27 Mar 2024 22:11), Max: 98.7 (27 Mar 2024 22:11)  HR: 86 (28 Mar 2024 05:00) (69 - 144)  BP: 131/70 (28 Mar 2024 05:00) (105/69 - 193/95)  BP(mean): 94 (28 Mar 2024 05:00) (82 - 132)  RR: 21 (28 Mar 2024 05:00) (14 - 31)  SpO2: 95% (28 Mar 2024 05:00) (94% - 100%)    Parameters below as of 28 Mar 2024 05:00  Patient On (Oxygen Delivery Method): room air        Physical Exam:  General: NAD, resting comfortably in bed. Well developed, well groomed  NEURO: AAOx3, CN II-XII grossly intact, EOMI, follows commands  HEENT: PERRL, MMM  CV: RRR, no MRG  PULM: CTAB, nonlabored breathing, no respiratory distress  ABD: soft, NT/ND.   EXTREM: WWP, no edema, no calf tenderness  VASC: No cyanosis, pallor. Palpable radial and PT bilaterally  SKIN: No rashes noted  PSYCH: Appropriate affect, answers questions appropriately    Lines/drains/tubes:    I&O's Summary    26 Mar 2024 07:01  -  27 Mar 2024 07:00  --------------------------------------------------------  IN: 1080 mL / OUT: 0 mL / NET: 1080 mL    27 Mar 2024 07:01  -  28 Mar 2024 06:23  --------------------------------------------------------  IN: 3000 mL / OUT: 2200 mL / NET: 800 mL        LABS:                        9.6    10.30 )-----------( 151      ( 28 Mar 2024 05:30 )             27.7     03-28    139  |  108  |  x   ----------------------------<  101<H>  3.7   |  22  |  0.93    Ca    8.4      28 Mar 2024 05:30  Phos  4.2     03-28  Mg     2.0     03-28    TPro  6.0  /  Alb  3.3  /  TBili  0.4  /  DBili  <0.2  /  AST  13  /  ALT  12  /  AlkPhos  64  03-27    PT/INR - ( 27 Mar 2024 09:55 )   PT: 11.2 sec;   INR: 0.98          PTT - ( 27 Mar 2024 09:55 )  PTT:39.5 sec  Urinalysis Basic - ( 28 Mar 2024 05:30 )    Color: x / Appearance: x / SG: x / pH: x  Gluc: 101 mg/dL / Ketone: x  / Bili: x / Urobili: x   Blood: x / Protein: x / Nitrite: x   Leuk Esterase: x / RBC: x / WBC x   Sq Epi: x / Non Sq Epi: x / Bacteria: x      CAPILLARY BLOOD GLUCOSE        LIVER FUNCTIONS - ( 27 Mar 2024 09:55 )  Alb: 3.3 g/dL / Pro: 6.0 g/dL / ALK PHOS: 64 U/L / ALT: 12 U/L / AST: 13 U/L / GGT: x             RADIOLOGY & ADDITIONAL STUDIES:

## 2024-03-28 NOTE — PROGRESS NOTE ADULT - ASSESSMENT
63 year old female active smoker with PMH EtOH use, HTN, Depression, Arthritis with recently discovered cecal and sigmoid polyps with pathology consistent with tubulovillous adenoma admitted for endoscopic resection vs. R hemicolectomy now s/p colonoscopy with cecal and sigmoid polypectomy c/b brisk bleeding from cecal polyp stalk (8 clips placed) requiring 1U PRBC and intermittent phenylephrine transferred to SICU for HD monitoring.    Neuro: AOx3. Tylenol for pain PRN, zofran prn for nausea. Chronic pain: continue gabapentin, holding home oxycodone 30mg 3 times/week as pain currently well-controlled.  CV: MAP goal > 65. Required intermittent phenylepherine during however hypertensive to 190s on arrival requiring hydralazine x2. HTN: holding home losartan and HCTZ in shai-procedural setting. Holding ASA in setting of GI bleed.  Pulm: on RA  GI: NPO. Holding home methylnaltrexone 150mg TID in setting of GI bleed. Advance to CLD pending next CBC  : Banegas catheter, CTM volume status, urine studies, BMP with creatinine   ID: no issues  Endo: ISS   PPx: SCD, holding HSQ in setting of GI bleed  Lines: L radial A line, PIVs  PT/OT: not indicated  Dispo: SICU currently. Tele pending f/u CBC

## 2024-03-28 NOTE — PROGRESS NOTE ADULT - SUBJECTIVE AND OBJECTIVE BOX
SUBJECTIVE: Pt seen and examined at bedside this am by surgery team. Patient is lying comfortably in bed with no complaints. Patient denies fever, nausea, vomiting, chest pain, and shortness of breath. No further blood per rectum.      MEDICATIONS  (STANDING):  chlorhexidine 2% Cloths 1 Application(s) Topical <User Schedule>  gabapentin 300 milliGRAM(s) Oral every 12 hours  influenza   Vaccine 0.5 milliLiter(s) IntraMuscular once  lactated ringers. 1000 milliLiter(s) (110 mL/Hr) IV Continuous <Continuous>  pantoprazole  Injectable 40 milliGRAM(s) IV Push daily  potassium chloride  10 mEq/100 mL IVPB 10 milliEquivalent(s) IV Intermittent every 1 hour    MEDICATIONS  (PRN):  ondansetron Injectable 4 milliGRAM(s) IV Push every 6 hours PRN Nausea      Vital Signs Last 24 Hrs  T(C): 36.9 (28 Mar 2024 06:27), Max: 37.1 (27 Mar 2024 22:11)  T(F): 98.4 (28 Mar 2024 06:27), Max: 98.7 (27 Mar 2024 22:11)  HR: 78 (28 Mar 2024 09:00) (69 - 144)  BP: 129/69 (28 Mar 2024 08:00) (105/69 - 193/95)  BP(mean): 89 (28 Mar 2024 08:00) (82 - 132)  RR: 19 (28 Mar 2024 09:00) (14 - 31)  SpO2: 97% (28 Mar 2024 09:00) (94% - 100%)    Parameters below as of 28 Mar 2024 09:00  Patient On (Oxygen Delivery Method): room air        PHYSICAL EXAM:   General: Patient is doing well and lying in bed comfortably  Constitutional: alert and oriented   Pulm: Nonlabored breathing, no respiratory distress  CV: Regular rate and rhythm, normal sinus rhythm  Abd: soft/nontender, some fullnesss in midepigastric region. No rebound, no guarding.   Extremities: warm, well perfused, no edema; distal pulses palpable  I&O's Detail    27 Mar 2024 07:01  -  28 Mar 2024 07:00  --------------------------------------------------------  IN:    IV PiggyBack: 650 mL    Lactated Ringers: 240 mL    Lactated Ringers: 720 mL    Lactated Ringers Bolus: 1000 mL    PRBCs (Packed Red Blood Cells): 600 mL  Total IN: 3210 mL    OUT:    Indwelling Catheter - Urethral (mL): 1230 mL    Voided (mL): 1000 mL  Total OUT: 2230 mL    Total NET: 980 mL      28 Mar 2024 07:01  -  28 Mar 2024 09:10  --------------------------------------------------------  IN:    IV PiggyBack: 100 mL    Lactated Ringers: 220 mL  Total IN: 320 mL    OUT:    Indwelling Catheter - Urethral (mL): 55 mL  Total OUT: 55 mL    Total NET: 265 mL        LABS:                        9.6    10.30 )-----------( 151      ( 28 Mar 2024 05:30 )             27.7     03-28    139  |  108  |  8   ----------------------------<  101<H>  3.7   |  22  |  0.93    Ca    8.4      28 Mar 2024 05:30  Phos  4.2     03-28  Mg     2.0     03-28    TPro  6.0  /  Alb  3.3  /  TBili  0.4  /  DBili  <0.2  /  AST  13  /  ALT  12  /  AlkPhos  64  03-27    PT/INR - ( 27 Mar 2024 09:55 )   PT: 11.2 sec;   INR: 0.98          PTT - ( 27 Mar 2024 09:55 )  PTT:39.5 sec  Urinalysis Basic - ( 28 Mar 2024 05:30 )    Color: x / Appearance: x / SG: x / pH: x  Gluc: 101 mg/dL / Ketone: x  / Bili: x / Urobili: x   Blood: x / Protein: x / Nitrite: x   Leuk Esterase: x / RBC: x / WBC x   Sq Epi: x / Non Sq Epi: x / Bacteria: x

## 2024-03-28 NOTE — PROGRESS NOTE ADULT - SUBJECTIVE AND OBJECTIVE BOX
Pt seen and examined at bedside.  S/p colonoscopy with sigmoid and cecal polyp EMR c/b bleeding   Transferred to SICU for monitoring with subsequent angio by IR for hemodynamic changes  S/p 3 total pRBCs since colonoscopy  Pt feels well this AM. No clinical GIB in 24 hours.  Some old clot reported by SICU team following angio.    REVIEW OF SYSTEMS:  Constitutional: No fever, weight loss or fatigue  Cardiovascular: No chest pain, palpitations, dizziness or leg swelling  Gastrointestinal: No abdominal or epigastric pain. No nausea, vomiting or hematemesis; No diarrhea or constipation. No melena or hematochezia.  Skin: No itching, burning, rashes or lesions     Allergies    No Known Allergies    Intolerances        MEDICATIONS:  MEDICATIONS  (STANDING):  gabapentin 300 milliGRAM(s) Oral every 12 hours  influenza   Vaccine 0.5 milliLiter(s) IntraMuscular once  lactated ringers. 1000 milliLiter(s) (110 mL/Hr) IV Continuous <Continuous>  nicotine -  14 mG/24Hr(s) Patch 1 Patch Transdermal daily  pantoprazole  Injectable 40 milliGRAM(s) IV Push daily  potassium chloride  10 mEq/100 mL IVPB 10 milliEquivalent(s) IV Intermittent every 1 hour    MEDICATIONS  (PRN):  ondansetron Injectable 4 milliGRAM(s) IV Push every 6 hours PRN Nausea      Vital Signs Last 24 Hrs  T(C): 36.9 (28 Mar 2024 06:27), Max: 37.1 (27 Mar 2024 22:11)  T(F): 98.4 (28 Mar 2024 06:27), Max: 98.7 (27 Mar 2024 22:11)  HR: 75 (28 Mar 2024 07:00) (69 - 144)  BP: 118/59 (28 Mar 2024 07:00) (105/69 - 193/95)  BP(mean): 85 (28 Mar 2024 07:00) (82 - 132)  RR: 23 (28 Mar 2024 07:00) (14 - 31)  SpO2: 95% (28 Mar 2024 07:00) (94% - 100%)    Parameters below as of 28 Mar 2024 06:00  Patient On (Oxygen Delivery Method): room air        03-27 @ 07:01  -  03-28 @ 07:00  --------------------------------------------------------  IN: 3000 mL / OUT: 2200 mL / NET: 800 mL    PHYSICAL EXAM:    General: No acute distress  Cardiac: Regular rate on tele  Pulm: Normal resp effort  Gastrointestinal: Soft non-tender non-distended. No rebound or guarding  Skin: Warm and dry. No obvious rash    LABS:  CBC Full  -  ( 28 Mar 2024 05:30 )  WBC Count : 10.30 K/uL  RBC Count : 3.10 M/uL  Hemoglobin : 9.6 g/dL  Hematocrit : 27.7 %  Platelet Count - Automated : 151 K/uL  Mean Cell Volume : 89.4 fl  Mean Cell Hemoglobin : 31.0 pg  Mean Cell Hemoglobin Concentration : 34.7 gm/dL  Auto Neutrophil # : x  Auto Lymphocyte # : x  Auto Monocyte # : x  Auto Eosinophil # : x  Auto Basophil # : x  Auto Neutrophil % : x  Auto Lymphocyte % : x  Auto Monocyte % : x  Auto Eosinophil % : x  Auto Basophil % : x    03-28    139  |  108  |  8   ----------------------------<  101<H>  3.7   |  22  |  0.93    Ca    8.4      28 Mar 2024 05:30  Phos  4.2     03-28  Mg     2.0     03-28    TPro  6.0  /  Alb  3.3  /  TBili  0.4  /  DBili  <0.2  /  AST  13  /  ALT  12  /  AlkPhos  64  03-27    PT/INR - ( 27 Mar 2024 09:55 )   PT: 11.2 sec;   INR: 0.98          PTT - ( 27 Mar 2024 09:55 )  PTT:39.5 sec      Urinalysis Basic - ( 28 Mar 2024 05:30 )    Color: x / Appearance: x / SG: x / pH: x  Gluc: 101 mg/dL / Ketone: x  / Bili: x / Urobili: x   Blood: x / Protein: x / Nitrite: x   Leuk Esterase: x / RBC: x / WBC x   Sq Epi: x / Non Sq Epi: x / Bacteria: x                RADIOLOGY & ADDITIONAL STUDIES:

## 2024-03-28 NOTE — PROGRESS NOTE ADULT - ASSESSMENT
63F with PMhx HTN recently found a large mass just distal to cecum occluding 2/3 lumen with high concern for malignancy as well as a pedunculated sigmoid polyp now s/p sigmoid and cecal polyp EMR c/b bleeding from polypectomy site at cecum treated with epi and hemoclips    Colonoscopy on 3/28  -A 7 cm pedunculated and multilobular polyp appears to be arising from cecum. The base of the stalk was lifted adequately using methylene blue and saline. Piecemeal snare polypectomy was performed.   -There was bleeding at the site of the polypectomy. Visualization was limited due to acute bleeding with looping, requiring change in position and resuscitation.   -After extensive irrigation and removal of clots, the site was identified with active bleeding. 2.5 mL of epinephrine 1/75722 was injected with partial hemostasis. One endoclip was placed. Residual tissue was resected. Site was ablated with hot biopsy forcep using soft coag. A total of 8 endoclips were placed with complete hemostasis.  -A single pedunculated 8 mm polyp of benign appearance was found in the sigmoid colon. A single-piece polypectomy was performed using a hot snare over a saline pillow in the sigmoid colon. The polyp was completely removed. One endo clip was applied.    Selective angiography on 3/27 of SMA and ileocolic arteries without e/o active bleeding    Hemodynamically stable overnight with appropriate response to 2 units pRBC in SICU though with slight downdrifting of Hgb however without clinical GIB in 24 hours which is reassuring.    Recommendations:  -Consider clear liquids today given absence of clinical bleeding  -Repeat Hgb this afternoon. Maintain active T&S  -Will need repeat colonoscopy in 6 months following piece meal EMR  -We will f/u pathology    Rodney Rico DO  Gastroenterology Fellow  Pager: [phone]  After 5PM or on weekends, please contact [place]  for fellow on call

## 2024-03-28 NOTE — PROGRESS NOTE ADULT - SUBJECTIVE AND OBJECTIVE BOX
63 year old female active smoker with PMH EtOH use, HTN, Depression, Arthritis with recently discovered cecal and sigmoid polyps with pathology consistent with tubulovillous adenoma admitted for endoscopic resection vs. R hemicolectomy now s/p colonoscopy with cecal and sigmoid polypectomy c/b brisk bleeding from cecal polyp stalk (8 clips placed) requiring 1U PRBC and intermittent phenylephrine transferred to SICU for HD monitoring. IR was consulted for angiography of SMA and SD on 3/27/24 which did not demonstrate evidence of active hemorrhage.    Access site is without significant erythema, edema or ecchymosis.  No concern for superficial access site complication.

## 2024-03-28 NOTE — PROGRESS NOTE ADULT - SUBJECTIVE AND OBJECTIVE BOX
ON: JOSÉ, urine output low this AM, pt received 500 CC bolus, no bloody BMs overnight, no SOB, CP, fever, chills, nausea, vomiting. Pt endorses back pain but mentions that the Tylenol has been helping. She endorses no difficulty passing gas.     SUBJECTIVE: Pt seen at bedside with attending. Pt in NAD, vitals stable, resting comfortably in bed. Denies any CP, SOB, F/C/N/V, and abdominal pain.     MEDICATIONS  (STANDING):  chlorhexidine 2% Cloths 1 Application(s) Topical <User Schedule>  gabapentin 300 milliGRAM(s) Oral every 12 hours  influenza   Vaccine 0.5 milliLiter(s) IntraMuscular once  pantoprazole  Injectable 40 milliGRAM(s) IV Push daily    MEDICATIONS  (PRN):  ondansetron Injectable 4 milliGRAM(s) IV Push every 6 hours PRN Nausea    Drips:     ICU Vital Signs Last 24 Hrs  T(C): 36.8 (28 Mar 2024 12:00), Max: 37.1 (27 Mar 2024 22:11)  T(F): 98.3 (28 Mar 2024 12:00), Max: 98.7 (27 Mar 2024 22:11)  HR: 84 (28 Mar 2024 12:00) (74 - 144)  BP: 154/66 (28 Mar 2024 12:00) (105/69 - 154/66)  BP(mean): 95 (28 Mar 2024 12:00) (82 - 103)  ABP: 141/66 (28 Mar 2024 11:00) (86/54 - 159/80)  ABP(mean): 92 (28 Mar 2024 11:00) (64 - 105)  RR: 20 (28 Mar 2024 12:00) (14 - 31)  SpO2: 98% (28 Mar 2024 12:00) (94% - 100%)    O2 Parameters below as of 28 Mar 2024 12:00  Patient On (Oxygen Delivery Method): room air    Physical Exam:  General: NAD, resting comfortably  NEURO: AAOx3, no focal deficits  HEENT: MMM  CV: RRR, no murmors  PULM: CTAB, nonlabored breathing, no respiratory distress  ABD: soft, NT/ND.   EXTREM: WWP, no edema  VASC: No cyanosis, pallor. Pulses palpable distally  SKIN: No rashes noted    Lines/tubes/drains:  [place]:  790 mL/1230mL       I&O's Summary    27 Mar 2024 07:01  -  28 Mar 2024 07:00  --------------------------------------------------------  IN: 3210 mL / OUT: 2230 mL / NET: 980 mL    28 Mar 2024 07:01  -  28 Mar 2024 12:26  --------------------------------------------------------  IN: 530 mL / OUT: 350 mL / NET: 180 mL        LABS:                        9.0    9.74  )-----------( 136      ( 28 Mar 2024 09:55 )             26.2     03-28    135  |  106  |  10  ----------------------------<  98  4.0   |  24  |  0.89    Ca    8.9      28 Mar 2024 10:06  Phos  4.2     03-28  Mg     2.0     03-28    TPro  6.0  /  Alb  3.3  /  TBili  0.4  /  DBili  <0.2  /  AST  13  /  ALT  12  /  AlkPhos  64  03-27    PT/INR - ( 27 Mar 2024 09:55 )   PT: 11.2 sec;   INR: 0.98          PTT - ( 27 Mar 2024 09:55 )  PTT:39.5 sec  Urinalysis Basic - ( 28 Mar 2024 10:06 )    Color: x / Appearance: x / SG: x / pH: x  Gluc: 98 mg/dL / Ketone: x  / Bili: x / Urobili: x   Blood: x / Protein: x / Nitrite: x   Leuk Esterase: x / RBC: x / WBC x   Sq Epi: x / Non Sq Epi: x / Bacteria: x      CAPILLARY BLOOD GLUCOSE        LIVER FUNCTIONS - ( 27 Mar 2024 09:55 )  Alb: 3.3 g/dL / Pro: 6.0 g/dL / ALK PHOS: 64 U/L / ALT: 12 U/L / AST: 13 U/L / GGT: x             Cultures:    RADIOLOGY & ADDITIONAL STUDIES:

## 2024-03-28 NOTE — PROGRESS NOTE ADULT - ASSESSMENT
63 year old female active smoker with PMH EtOH use, HTN, Depression, Arthritis with recently discovered cecal and sigmoid polyps with pathology consistent with tubulovillous adenoma admitted for endoscopic resection vs. R hemicolectomy now s/p colonoscopy with cecal and sigmoid polypectomy c/b brisk bleeding from cecal polyp stalk (8 clips placed) requiring 1U PRBC and intermittent phenylephrine transferred to SICU for HD monitoring.    Plan:

## 2024-03-29 ENCOUNTER — TRANSCRIPTION ENCOUNTER (OUTPATIENT)
Age: 64
End: 2024-03-29

## 2024-03-29 LAB
A1C WITH ESTIMATED AVERAGE GLUCOSE RESULT: 5.1 % — SIGNIFICANT CHANGE UP (ref 4–5.6)
ANION GAP SERPL CALC-SCNC: 8 MMOL/L — SIGNIFICANT CHANGE UP (ref 5–17)
BUN SERPL-MCNC: 14 MG/DL — SIGNIFICANT CHANGE UP (ref 7–23)
CALCIUM SERPL-MCNC: 9.4 MG/DL — SIGNIFICANT CHANGE UP (ref 8.4–10.5)
CHLORIDE SERPL-SCNC: 108 MMOL/L — SIGNIFICANT CHANGE UP (ref 96–108)
CO2 SERPL-SCNC: 25 MMOL/L — SIGNIFICANT CHANGE UP (ref 22–31)
CREAT SERPL-MCNC: 0.95 MG/DL — SIGNIFICANT CHANGE UP (ref 0.5–1.3)
EGFR: 67 ML/MIN/1.73M2 — SIGNIFICANT CHANGE UP
ESTIMATED AVERAGE GLUCOSE: 100 MG/DL — SIGNIFICANT CHANGE UP (ref 68–114)
GLUCOSE SERPL-MCNC: 93 MG/DL — SIGNIFICANT CHANGE UP (ref 70–99)
HCT VFR BLD CALC: 25.3 % — LOW (ref 34.5–45)
HCT VFR BLD CALC: 25.5 % — LOW (ref 34.5–45)
HGB BLD-MCNC: 8.6 G/DL — LOW (ref 11.5–15.5)
HGB BLD-MCNC: 8.7 G/DL — LOW (ref 11.5–15.5)
MAGNESIUM SERPL-MCNC: 1.9 MG/DL — SIGNIFICANT CHANGE UP (ref 1.6–2.6)
MCHC RBC-ENTMCNC: 31.5 PG — SIGNIFICANT CHANGE UP (ref 27–34)
MCHC RBC-ENTMCNC: 31.6 PG — SIGNIFICANT CHANGE UP (ref 27–34)
MCHC RBC-ENTMCNC: 33.7 GM/DL — SIGNIFICANT CHANGE UP (ref 32–36)
MCHC RBC-ENTMCNC: 34.4 GM/DL — SIGNIFICANT CHANGE UP (ref 32–36)
MCV RBC AUTO: 91.7 FL — SIGNIFICANT CHANGE UP (ref 80–100)
MCV RBC AUTO: 93.8 FL — SIGNIFICANT CHANGE UP (ref 80–100)
NRBC # BLD: 0 /100 WBCS — SIGNIFICANT CHANGE UP (ref 0–0)
NRBC # BLD: 0 /100 WBCS — SIGNIFICANT CHANGE UP (ref 0–0)
PHOSPHATE SERPL-MCNC: 4.5 MG/DL — SIGNIFICANT CHANGE UP (ref 2.5–4.5)
PLATELET # BLD AUTO: 135 K/UL — LOW (ref 150–400)
PLATELET # BLD AUTO: 139 K/UL — LOW (ref 150–400)
POTASSIUM SERPL-MCNC: 3.6 MMOL/L — SIGNIFICANT CHANGE UP (ref 3.5–5.3)
POTASSIUM SERPL-SCNC: 3.6 MMOL/L — SIGNIFICANT CHANGE UP (ref 3.5–5.3)
RBC # BLD: 2.72 M/UL — LOW (ref 3.8–5.2)
RBC # BLD: 2.76 M/UL — LOW (ref 3.8–5.2)
RBC # FLD: 15.3 % — HIGH (ref 10.3–14.5)
RBC # FLD: 15.3 % — HIGH (ref 10.3–14.5)
SODIUM SERPL-SCNC: 141 MMOL/L — SIGNIFICANT CHANGE UP (ref 135–145)
WBC # BLD: 7.9 K/UL — SIGNIFICANT CHANGE UP (ref 3.8–10.5)
WBC # BLD: 8.73 K/UL — SIGNIFICANT CHANGE UP (ref 3.8–10.5)
WBC # FLD AUTO: 7.9 K/UL — SIGNIFICANT CHANGE UP (ref 3.8–10.5)
WBC # FLD AUTO: 8.73 K/UL — SIGNIFICANT CHANGE UP (ref 3.8–10.5)

## 2024-03-29 PROCEDURE — 99232 SBSQ HOSP IP/OBS MODERATE 35: CPT | Mod: GC

## 2024-03-29 PROCEDURE — 99232 SBSQ HOSP IP/OBS MODERATE 35: CPT

## 2024-03-29 RX ORDER — LIDOCAINE 4 G/100G
1 CREAM TOPICAL ONCE
Refills: 0 | Status: COMPLETED | OUTPATIENT
Start: 2024-03-29 | End: 2024-03-29

## 2024-03-29 RX ORDER — OXYCODONE HYDROCHLORIDE 5 MG/1
5 TABLET ORAL EVERY 6 HOURS
Refills: 0 | Status: DISCONTINUED | OUTPATIENT
Start: 2024-03-29 | End: 2024-03-30

## 2024-03-29 RX ORDER — ACETAMINOPHEN 500 MG
1000 TABLET ORAL ONCE
Refills: 0 | Status: COMPLETED | OUTPATIENT
Start: 2024-03-29 | End: 2024-03-29

## 2024-03-29 RX ORDER — LOSARTAN POTASSIUM 100 MG/1
50 TABLET, FILM COATED ORAL DAILY
Refills: 0 | Status: DISCONTINUED | OUTPATIENT
Start: 2024-03-29 | End: 2024-03-30

## 2024-03-29 RX ORDER — POTASSIUM CHLORIDE 20 MEQ
40 PACKET (EA) ORAL ONCE
Refills: 0 | Status: COMPLETED | OUTPATIENT
Start: 2024-03-29 | End: 2024-03-29

## 2024-03-29 RX ADMIN — Medication 1000 MILLIGRAM(S): at 17:06

## 2024-03-29 RX ADMIN — GABAPENTIN 300 MILLIGRAM(S): 400 CAPSULE ORAL at 17:06

## 2024-03-29 RX ADMIN — PANTOPRAZOLE SODIUM 40 MILLIGRAM(S): 20 TABLET, DELAYED RELEASE ORAL at 11:25

## 2024-03-29 RX ADMIN — Medication 1000 MILLIGRAM(S): at 10:00

## 2024-03-29 RX ADMIN — LIDOCAINE 1 PATCH: 4 CREAM TOPICAL at 18:06

## 2024-03-29 RX ADMIN — Medication 1000 MILLIGRAM(S): at 18:06

## 2024-03-29 RX ADMIN — Medication 40 MILLIEQUIVALENT(S): at 09:47

## 2024-03-29 RX ADMIN — LIDOCAINE 1 PATCH: 4 CREAM TOPICAL at 17:08

## 2024-03-29 RX ADMIN — OXYCODONE HYDROCHLORIDE 5 MILLIGRAM(S): 5 TABLET ORAL at 17:21

## 2024-03-29 RX ADMIN — CHLORHEXIDINE GLUCONATE 1 APPLICATION(S): 213 SOLUTION TOPICAL at 05:31

## 2024-03-29 RX ADMIN — LOSARTAN POTASSIUM 50 MILLIGRAM(S): 100 TABLET, FILM COATED ORAL at 17:21

## 2024-03-29 RX ADMIN — OXYCODONE HYDROCHLORIDE 5 MILLIGRAM(S): 5 TABLET ORAL at 18:06

## 2024-03-29 RX ADMIN — GABAPENTIN 300 MILLIGRAM(S): 400 CAPSULE ORAL at 05:31

## 2024-03-29 RX ADMIN — Medication 400 MILLIGRAM(S): at 09:56

## 2024-03-29 NOTE — DISCHARGE NOTE PROVIDER - NSDCFUADDINST_GEN_ALL_CORE_FT
HTN General Discharge Instructions:  Please resume all regular home medications unless specifically advised not to take a particular medication. Also, please take any new medications as prescribed.  Please get plenty of rest, continue to ambulate several times per day, and drink adequate amounts of fluids. Avoid lifting weights greater than 5-10 lbs until you follow-up with your surgeon, who will instruct you further regarding activity restrictions.  Avoid driving or operating heavy machinery while taking pain medications.  Please follow-up with your surgeon and Primary Care Provider (PCP) as advised.  Incision Care:  *Please call your doctor if you have increased pain, swelling, redness, or drainage from the incision site.  *Avoid swimming and baths until your follow-up appointment.  *You may shower, and wash surgical incisions with a mild soap and warm water. Gently pat the area dry.  Warning Signs:  Please call your doctor if you experience the following:  *You experience new chest pain, pressure, squeezing or tightness.  *New or worsening cough, shortness of breath, or wheeze.  *If you are vomiting and cannot keep down fluids or your medications.  *You are getting dehydrated due to continued vomiting, diarrhea, or other reasons. Signs of dehydration include dry mouth, rapid heartbeat, or feeling dizzy or faint when standing.  *You see blood or dark/black material when you vomit or have a bowel movement.  *You experience burning when you urinate, have blood in your urine, or experience a discharge.  *Your pain is not improving within 8-12 hours or is not gone within 24 hours. Call or return immediately if your pain is getting worse, changes location, or moves to your chest or back.  *You have shaking chills, or fever greater than 101.5 degrees Fahrenheit or 38 degrees Celsius.  *Any change in your symptoms, or any new symptoms that concern you.   You have been prescribed Colace, please take once daily as needed for constipation. You may resume all other home medications.     General Discharge Instructions:  Please resume all regular home medications unless specifically advised not to take a particular medication. Also, please take any new medications as prescribed.  Please get plenty of rest, continue to ambulate several times per day, and drink adequate amounts of fluids. Avoid lifting weights greater than 5-10 lbs until you follow-up with your surgeon, who will instruct you further regarding activity restrictions.  Avoid driving or operating heavy machinery while taking pain medications.  Please follow-up with your surgeon and Primary Care Provider (PCP) as advised.  Incision Care:  *Please call your doctor if you have increased pain, swelling, redness, or drainage from the incision site.  *Avoid swimming and baths until your follow-up appointment.  *You may shower, and wash surgical incisions with a mild soap and warm water. Gently pat the area dry.  Warning Signs:  Please call your doctor if you experience the following:  *You experience new chest pain, pressure, squeezing or tightness.  *New or worsening cough, shortness of breath, or wheeze.  *If you are vomiting and cannot keep down fluids or your medications.  *You are getting dehydrated due to continued vomiting, diarrhea, or other reasons. Signs of dehydration include dry mouth, rapid heartbeat, or feeling dizzy or faint when standing.  *You see blood or dark/black material when you vomit or have a bowel movement.  *You experience burning when you urinate, have blood in your urine, or experience a discharge.  *Your pain is not improving within 8-12 hours or is not gone within 24 hours. Call or return immediately if your pain is getting worse, changes location, or moves to your chest or back.  *You have shaking chills, or fever greater than 101.5 degrees Fahrenheit or 38 degrees Celsius.  *Any change in your symptoms, or any new symptoms that concern you.   You have been prescribed Colace, please take once daily as needed for constipation.  You may resume your home Aspirin on 4/2, you may restart all other home medications as prescribed. Please follow up with your PCP as soon as possible.  Please follow up with Vascular Surgery, Dr. 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  Please follow up with Gastroenterology, Dr. Milner, in 6 months for a repeat colonoscopy.    General Discharge Instructions:  Please resume all regular home medications unless specifically advised not to take a particular medication. Also, please take any new medications as prescribed.  Please get plenty of rest, continue to ambulate several times per day, and drink adequate amounts of fluids. Avoid lifting weights greater than 5-10 lbs until you follow-up with your surgeon, who will instruct you further regarding activity restrictions.  Avoid driving or operating heavy machinery while taking pain medications.  Please follow-up with your surgeon and Primary Care Provider (PCP) as advised.  Incision Care:  *Please call your doctor if you have increased pain, swelling, redness, or drainage from the incision site.  *Avoid swimming and baths until your follow-up appointment.  *You may shower, and wash surgical incisions with a mild soap and warm water. Gently pat the area dry.  Warning Signs:  Please call your doctor if you experience the following:  *You experience new chest pain, pressure, squeezing or tightness.  *New or worsening cough, shortness of breath, or wheeze.  *If you are vomiting and cannot keep down fluids or your medications.  *You are getting dehydrated due to continued vomiting, diarrhea, or other reasons. Signs of dehydration include dry mouth, rapid heartbeat, or feeling dizzy or faint when standing.  *You see blood or dark/black material when you vomit or have a bowel movement.  *You experience burning when you urinate, have blood in your urine, or experience a discharge.  *Your pain is not improving within 8-12 hours or is not gone within 24 hours. Call or return immediately if your pain is getting worse, changes location, or moves to your chest or back.  *You have shaking chills, or fever greater than 101.5 degrees Fahrenheit or 38 degrees Celsius.  *Any change in your symptoms, or any new symptoms that concern you.

## 2024-03-29 NOTE — PROGRESS NOTE ADULT - ASSESSMENT
63F with PMHx HTN, Depression, Arthritis presents to [facility] ED referred by  Dr. Flynn for 2 month history of abdominal pain i/s/o recently discovered non obstructing mass just distal to cecum concerning for malignancy and pedunculated sigmoid polyp.     #Cecal Mass  #Pre-op  Patient underwent her initial colonoscopy on 02/02/24 and was found to have a non-obstructing cecal mass with poor prep, s/p biopsy revealing tubulovillous adenoma. Patient presenting with persistent abdominal pain for 2 months. CTA/P revealed. 2.5 x 3.7 x 2.4 cm polypoid mass seen in the superior cecum, no definite extracolonic extension. Patient is planned for repeat colonoscopy 03/27 for polyp removal and potential hemicolectomy if needed. Patient is an active smoker, 2-3 cigs/week and drinks ETOH, last drink 1 week ago. No history of ETOH withdrawal, seizures, DTs, or intubation. On ASA for primary prevention. No recent AC or NSAID use. EKG NSR. Denies chest pain, SOB or MENDOZA on ambulation or rest. ET 4-5 blocks, 4 flights of stairs with assistance from walker.   - RCRI Class I   - Chavez 0.4%  - METS >4 (ambulates with walker: ET 4-5 bocks, 4 flights of stairs)  - Continue to hold home ASA (for primary prevention, last dose 4 days ago)  - Offered Nicotine patch (smokes 2-3 cigarettes/week), however patient deferred  - Patient is low-risk for intermediate-risk procedure. No further cardiac work-up required at this time. Patient is medically optimized for upcoming colonoscopy/hemicolectomy  - S/p Colonoscopy 3/27: 7cm pedunculated and multilobular polyp in the cecum s/p snare polypectomy c/b acute bleeding s/p epinephrine injection and 8 endoclips. An additional single pedunculated 8 mm polyp of benign appearance was found in the sigmoid colon s/p polypectomy and 1 endoclip. S/p 1U pRBC given acute bleeding during the procedure and transferred to the SICU for monitoring. IR was consulted for angiography of SMA and SD on 3/27/24 which did not demonstrate evidence of active hemorrhage.     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 On home Losartan 50mg. Today, patient was hypertensive to  while off Losartan s/p Hydralazine 10mg IVPx1  - Restart home Losartan 50mg qd if patient remains hemodynamically stable  - If SBP >180, can give Hydralazine 10mg IVP (preferred over Labetalol given HR 60-65)    #Back Pain  Patient with known back pain 2/2 herniated discs. Patient uses lidocaine patches at home and PO Oxy 30mg PRN (uses 2-3x/month per patient).  - Recommend Lidocaine patch for mild, tylenol PRN for mod, and oxycodone 5 for severe breakthru pain    Medicine will continue to follow along with you. Discussed case with Dr. Burnett 63F with PMHx HTN, Depression, Arthritis presents to [facility] ED referred by  Dr. Flynn for 2 month history of abdominal pain i/s/o recently discovered non obstructing mass just distal to cecum concerning for malignancy and pedunculated sigmoid polyp.     #Cecal Mass  #Pre-op  Patient underwent her initial colonoscopy on 02/02/24 and was found to have a non-obstructing cecal mass with poor prep, s/p biopsy revealing tubulovillous adenoma. Patient presenting with persistent abdominal pain for 2 months. CTA/P revealed. 2.5 x 3.7 x 2.4 cm polypoid mass seen in the superior cecum, no definite extracolonic extension. Patient is planned for repeat colonoscopy 03/27 for polyp removal and potential hemicolectomy if needed. Patient is an active smoker, 2-3 cigs/week and drinks ETOH, last drink 1 week ago. No history of ETOH withdrawal, seizures, DTs, or intubation. On ASA for primary prevention. No recent AC or NSAID use. EKG NSR. Denies chest pain, SOB or MENDOZA on ambulation or rest. ET 4-5 blocks, 4 flights of stairs with assistance from walker.   - RCRI Class I   - Chavez 0.4%  - METS >4 (ambulates with walker: ET 4-5 bocks, 4 flights of stairs)  - Continue to hold home ASA (for primary prevention, last dose 4 days ago)  - Offered Nicotine patch (smokes 2-3 cigarettes/week), however patient deferred  - Patient is low-risk for intermediate-risk procedure. No further cardiac work-up required at this time. Patient is medically optimized for upcoming colonoscopy/hemicolectomy  - S/p Colonoscopy 3/27: 7cm pedunculated and multilobular polyp in the cecum s/p snare polypectomy c/b acute bleeding s/p epinephrine injection and 8 endoclips. An additional single pedunculated 8 mm polyp of benign appearance was found in the sigmoid colon s/p polypectomy and 1 endoclip. S/p 1U pRBC given acute bleeding during the procedure and transferred to the SICU for monitoring. IR was consulted for angiography of SMA and SD on 3/27/24 which did not demonstrate evidence of active hemorrhage.     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 On home Losartan 50mg. Today, patient was hypertensive to  while off Losartan s/p Hydralazine 10mg IVPx1  - Restart home Losartan 50mg qd if patient remains hemodynamically stable  - If SBP >180, can give Hydralazine 10mg IVP (preferred over Labetalol given HR 60-65)    #Back Pain  Patient with known back pain 2/2 herniated discs. Patient uses lidocaine patches at home and PO Oxy 30mg PRN (uses 2-3x/month per patient).  - Recommend Lidocaine patch for mild, tylenol PRN for mod, and oxycodone 5 for severe breakthru pain    Medicine will continue to follow along with you. Discussed case with Dr. Burnett

## 2024-03-29 NOTE — DISCHARGE NOTE PROVIDER - NSDCCPTREATMENT_GEN_ALL_CORE_FT
PRINCIPAL PROCEDURE  Procedure: Colonoscopy with polypectomy and biopsy if indicated  Findings and Treatment:

## 2024-03-29 NOTE — DISCHARGE NOTE PROVIDER - NSDCMRMEDTOKEN_GEN_ALL_CORE_FT
Colace 100 mg oral capsule: 1 cap(s) orally once a day as needed for  constipation MDD: 1 capsule  losartan 50 mg oral tablet: 1 tab(s) orally once a day

## 2024-03-29 NOTE — DISCHARGE NOTE PROVIDER - CARE PROVIDERS DIRECT ADDRESSES
[email or web address]

## 2024-03-29 NOTE — DISCHARGE NOTE PROVIDER - PROVIDER TOKENS
PROVIDER:[TOKEN:[[id number]]] PROVIDER:[TOKEN:[[id number]]],PROVIDER:[TOKEN:[[id number]]],PROVIDER:[TOKEN:[[id number]],FOLLOWUP:[2 weeks]]

## 2024-03-29 NOTE — DISCHARGE NOTE PROVIDER - NSDCFUADDAPPT_GEN_ALL_CORE_FT
Please follow up with Dr. Flynn in 1-2 weeks, you may call to schedule an appointment. Please follow up with Dr. Flynn in 1-2 weeks, you may call to schedule an appointment.  Please follow up with Dr. Jiménez in 1-2 weeks, you may call to schedule an appointment.  Please follow up with Dr. Milner in 6 months for a repeat colonoscopy.  Please see you PCP as soon as possible.

## 2024-03-29 NOTE — PROGRESS NOTE ADULT - SUBJECTIVE AND OBJECTIVE BOX
GASTROENTEROLOGY PROGRESS NOTE  Patient seen and examined at bedside.  -Hgb downtrend from 9.0 to 8.6 to 8.7 this AM  -One episode of melena this morning   -ABHISHEK with scant stool, no blood appreciated    ROS: Patient notes this morning having one episode of dark colored, loose stool this morning. States her stool was brown in color yesterday. Still notes some residual RLQ tenderness following her procedure the other day. Otherwise with no complaints of fevers, chills, NS, nausea/vomiting.     PERTINENT REVIEW OF SYSTEMS:  CONSTITUTIONAL: No weakness, fevers or chills  HEENT: No visual changes; No vertigo or throat pain   GASTROINTESTINAL: As above.  NEUROLOGICAL: No numbness or weakness  SKIN: No itching, burning, rashes, or lesions     Allergies    No Known Allergies    Intolerances      MEDICATIONS:  MEDICATIONS  (STANDING):  chlorhexidine 2% Cloths 1 Application(s) Topical <User Schedule>  gabapentin 300 milliGRAM(s) Oral every 12 hours  influenza   Vaccine 0.5 milliLiter(s) IntraMuscular once  pantoprazole  Injectable 40 milliGRAM(s) IV Push daily    MEDICATIONS  (PRN):  ondansetron Injectable 4 milliGRAM(s) IV Push every 6 hours PRN Nausea    Vital Signs Last 24 Hrs  T(C): 36.6 (29 Mar 2024 14:00), Max: 36.7 (28 Mar 2024 17:38)  T(F): 97.9 (29 Mar 2024 14:00), Max: 98.1 (28 Mar 2024 17:38)  HR: 88 (29 Mar 2024 11:33) (70 - 88)  BP: 157/67 (29 Mar 2024 11:33) (119/69 - 159/72)  BP(mean): 97 (29 Mar 2024 11:33) (89 - 109)  RR: 18 (29 Mar 2024 11:33) (16 - 18)  SpO2: 95% (29 Mar 2024 11:33) (95% - 98%)    Parameters below as of 29 Mar 2024 11:33  Patient On (Oxygen Delivery Method): room air        03-28 @ 07:01  -  03-29 @ 07:00  --------------------------------------------------------  IN: 580 mL / OUT: 2250 mL / NET: -1670 mL    03-29 @ 07:01  -  03-29 @ 15:13  --------------------------------------------------------  IN: 340 mL / OUT: 150 mL / NET: 190 mL      PHYSICAL EXAM:    General: in no acute distress  HEENT: MMM, conjunctiva and sclera clear  Gastrointestinal: Soft non-tender non-distended; No rebound or guarding  Rectal: scant stool, no blood noted  Skin: Warm and dry. No obvious rash    LABS:                        8.7    8.73  )-----------( 135      ( 29 Mar 2024 10:57 )             25.3     03-29    141  |  108  |  14  ----------------------------<  93  3.6   |  25  |  0.95    Ca    9.4      29 Mar 2024 05:30  Phos  4.5     03-29  Mg     1.9     03-29            Urinalysis Basic - ( 29 Mar 2024 05:30 )    Color: x / Appearance: x / SG: x / pH: x  Gluc: 93 mg/dL / Ketone: x  / Bili: x / Urobili: x   Blood: x / Protein: x / Nitrite: x   Leuk Esterase: x / RBC: x / WBC x   Sq Epi: x / Non Sq Epi: x / Bacteria: x                RADIOLOGY & ADDITIONAL STUDIES:  Reviewed

## 2024-03-29 NOTE — PROGRESS NOTE ADULT - SUBJECTIVE AND OBJECTIVE BOX
STATUS POST:    3/27 - Colonoscopy w/ cecal and sigmoid polypectomy  3/27 - IR Angio    SUBJECTIVE: Pt seen and examined at bedside this am by surgery team. Patient is lying comfortably in bed, has mild lower abdominal soreness. Tolerating clears, pain well controlled with current regimen. Patient denies fever, nausea, vomiting, chest pain, and shortness of breath. Patient is passing gas and having bowel movements.     MEDICATIONS  (STANDING):  chlorhexidine 2% Cloths 1 Application(s) Topical <User Schedule>  gabapentin 300 milliGRAM(s) Oral every 12 hours  influenza   Vaccine 0.5 milliLiter(s) IntraMuscular once  pantoprazole  Injectable 40 milliGRAM(s) IV Push daily    MEDICATIONS  (PRN):  ondansetron Injectable 4 milliGRAM(s) IV Push every 6 hours PRN Nausea      Vital Signs Last 24 Hrs  T(C): 36.6 (29 Mar 2024 04:00), Max: 37 (28 Mar 2024 09:00)  T(F): 97.9 (29 Mar 2024 04:00), Max: 98.6 (28 Mar 2024 09:00)  HR: 70 (29 Mar 2024 04:37) (70 - 95)  BP: 154/76 (29 Mar 2024 04:37) (119/69 - 159/72)  BP(mean): 108 (29 Mar 2024 04:37) (85 - 108)  RR: 18 (29 Mar 2024 04:37) (16 - 20)  SpO2: 96% (29 Mar 2024 04:37) (95% - 98%)    Parameters below as of 29 Mar 2024 04:37  Patient On (Oxygen Delivery Method): room air        Physical Exam  General: Patient is doing well and lying in bed comfortably  Constitutional: alert and oriented   Pulm: Nonlabored breathing, no respiratory distress  CV: Regular rate and rhythm, normal sinus rhythm  Abd:  soft, minimally tender lower quadrants, nondistended. No rebound, no guarding.   Extremities: warm, well perfused, no edema    I&O's Detail    28 Mar 2024 07:01  -  29 Mar 2024 07:00  --------------------------------------------------------  IN:    IV PiggyBack: 200 mL    Lactated Ringers: 330 mL    Oral Fluid: 50 mL  Total IN: 580 mL    OUT:    Indwelling Catheter - Urethral (mL): 1750 mL    Voided (mL): 500 mL  Total OUT: 2250 mL    Total NET: -1670 mL        LABS:                        8.6    7.90  )-----------( 139      ( 29 Mar 2024 05:30 )             25.5     03-29    141  |  108  |  14  ----------------------------<  93  3.6   |  25  |  0.95    Ca    9.4      29 Mar 2024 05:30  Phos  4.5     03-29  Mg     1.9     03-29    TPro  6.0  /  Alb  3.3  /  TBili  0.4  /  DBili  <0.2  /  AST  13  /  ALT  12  /  AlkPhos  64  03-27    PT/INR - ( 27 Mar 2024 09:55 )   PT: 11.2 sec;   INR: 0.98          PTT - ( 27 Mar 2024 09:55 )  PTT:39.5 sec  Urinalysis Basic - ( 29 Mar 2024 05:30 )    Color: x / Appearance: x / SG: x / pH: x  Gluc: 93 mg/dL / Ketone: x  / Bili: x / Urobili: x   Blood: x / Protein: x / Nitrite: x   Leuk Esterase: x / RBC: x / WBC x   Sq Epi: x / Non Sq Epi: x / Bacteria: x

## 2024-03-29 NOTE — PROVIDER CONTACT NOTE (OTHER) - BACKGROUND
Pt had a colon polypectomy on 3/27, pt had a similar black bowel movement around 7am, Hgb was rechecked after previous bowel movement and remained stable

## 2024-03-29 NOTE — PROGRESS NOTE ADULT - SUBJECTIVE AND OBJECTIVE BOX
INTERVAL HPI/OVERNIGHT EVENTS:  No acute events overnight.     SUBJECTIVE: Patient seen and examined at bedside, resting comfortably in bed, and does not appear to be in any acute distress. Patient states she had dark-colored stools early this morning, which is expected. Patient denies abdominal pain. Endorsed known back pain. No active complaints.    MEDICATIONS  (STANDING):  chlorhexidine 2% Cloths 1 Application(s) Topical <User Schedule>  gabapentin 300 milliGRAM(s) Oral every 12 hours  influenza   Vaccine 0.5 milliLiter(s) IntraMuscular once  pantoprazole  Injectable 40 milliGRAM(s) IV Push daily    MEDICATIONS  (PRN):  ondansetron Injectable 4 milliGRAM(s) IV Push every 6 hours PRN Nausea      Vital Signs Last 24 Hrs  T(C): 36.6 (29 Mar 2024 09:07), Max: 36.8 (28 Mar 2024 12:00)  T(F): 97.8 (29 Mar 2024 09:07), Max: 98.3 (28 Mar 2024 12:00)  HR: 84 (29 Mar 2024 08:47) (70 - 95)  BP: 149/84 (29 Mar 2024 08:47) (119/69 - 159/72)  BP(mean): 109 (29 Mar 2024 08:47) (86 - 109)  RR: 16 (29 Mar 2024 08:47) (16 - 20)  SpO2: 97% (29 Mar 2024 08:47) (95% - 98%)    Parameters below as of 29 Mar 2024 08:47  Patient On (Oxygen Delivery Method): room air        PHYSICAL EXAM:  General: in no acute distress  Eyes: EOMI intact bilaterally. Anicteric sclerae, moist conjunctivae  HENT: Moist mucous membranes  Neck: Trachea midline, supple  Lungs: CTA B/L. No wheezes, rales, or rhonchi  Cardiovascular: RRR. No murmurs, rubs, or gallops  Abdomen: Soft, non-tender non-distended; No rebound or guarding  Extremities: WWP, No clubbing, cyanosis or edema  Neurological: Alert and oriented  Skin: Warm and dry. No obvious rash     LABS:                        8.7    8.73  )-----------( 135      ( 29 Mar 2024 10:57 )             25.3     03-29    141  |  108  |  14  ----------------------------<  93  3.6   |  25  |  0.95    Ca    9.4      29 Mar 2024 05:30  Phos  4.5     03-29  Mg     1.9     03-29        Urinalysis Basic - ( 29 Mar 2024 05:30 )    Color: x / Appearance: x / SG: x / pH: x  Gluc: 93 mg/dL / Ketone: x  / Bili: x / Urobili: x   Blood: x / Protein: x / Nitrite: x   Leuk Esterase: x / RBC: x / WBC x   Sq Epi: x / Non Sq Epi: x / Bacteria: x        MICROBIOLOGY:    RADIOLOGY & ADDITIONAL STUDIES:

## 2024-03-29 NOTE — PROGRESS NOTE ADULT - ASSESSMENT
63F with PMhx HTN recently found a large mass just distal to cecum occluding 2/3 lumen with high concern for malignancy as well as a pedunculated sigmoid polyp now s/p sigmoid and cecal polyp EMR c/b bleeding from polypectomy site at cecum treated with epi and hemoclips    Colonoscopy on 3/28  -A 7 cm pedunculated and multilobular polyp appears to be arising from cecum. The base of the stalk was lifted adequately using methylene blue and saline. Piecemeal snare polypectomy was performed. (PATH: TVA)  -There was bleeding at the site of the polypectomy. Visualization was limited due to acute bleeding with looping, requiring change in position and resuscitation.   -After extensive irrigation and removal of clots, the site was identified with active bleeding. 2.5 mL of epinephrine 1/46115 was injected with partial hemostasis. One endoclip was placed. Residual tissue was resected. Site was ablated with hot biopsy forcep using soft coag. A total of 8 endoclips were placed with complete hemostasis.  -A single pedunculated 8 mm polyp of benign appearance was found in the sigmoid colon. A single-piece polypectomy was performed using a hot snare over a saline pillow in the sigmoid colon. The polyp was completely removed. One endo clip was applied. (PATH: TVA)    Selective angiography on 3/27 of SMA and ileocolic arteries without e/o active bleeding    Hemodynamically stable overnight however with downtrending Hgb from 9.0 to 8.6 to 8.7. VSS. Reported one episode of dark colored loose stool this AM however ABHISHEK with no blood. Suspect Hgb equilibrating, low suspicion for active bleeding at this time.      Recommendations:  -Advance diet as tolerated  -Monitor H/H  -Will need repeat colonoscopy in 6 months following piece meal EMR    Case discussed with [facility] [place] [place] attending and primary team.     Saundra Rod DO  Gastroenterology Fellow  Pager: [phone]

## 2024-03-29 NOTE — PROGRESS NOTE ADULT - SUBJECTIVE AND OBJECTIVE BOX
SUBJECTIVE: Pt seen and examined at the bedside by surgical team. Doing well, soft, diffuse TTP, ND.     Vital Signs Last 24 Hrs  T(C): 36.6 (29 Mar 2024 04:00), Max: 37 (28 Mar 2024 09:00)  T(F): 97.9 (29 Mar 2024 04:00), Max: 98.6 (28 Mar 2024 09:00)  HR: 70 (29 Mar 2024 04:37) (70 - 95)  BP: 154/76 (29 Mar 2024 04:37) (119/69 - 159/72)  BP(mean): 108 (29 Mar 2024 04:37) (85 - 108)  RR: 18 (29 Mar 2024 04:37) (16 - 21)  SpO2: 96% (29 Mar 2024 04:37) (95% - 98%)    Parameters below as of 29 Mar 2024 04:37  Patient On (Oxygen Delivery Method): room air        I&O's Summary    28 Mar 2024 07:01  -  29 Mar 2024 07:00  --------------------------------------------------------  IN: 580 mL / OUT: 1750 mL / NET: -1170 mL      Physical Exam:  General Appearance: Appears well, NAD  Pulmonary: Nonlabored breathing, no respiratory distress  Cardiovascular: NSR  Abdomen: Soft, nondisteded, appropriate incisional tenderness, dressings clean and dry and intact  Extremities: WWP, SCD's in place     LABS:                        8.6    7.90  )-----------( 139      ( 29 Mar 2024 05:30 )             25.5     03-29    141  |  108  |  14  ----------------------------<  93  3.6   |  25  |  0.95    Ca    9.4      29 Mar 2024 05:30  Phos  4.5     03-29  Mg     1.9     03-29    TPro  6.0  /  Alb  3.3  /  TBili  0.4  /  DBili  <0.2  /  AST  13  /  ALT  12  /  AlkPhos  64  03-27    PT/INR - ( 27 Mar 2024 09:55 )   PT: 11.2 sec;   INR: 0.98          PTT - ( 27 Mar 2024 09:55 )  PTT:39.5 sec  Urinalysis Basic - ( 29 Mar 2024 05:30 )    Color: x / Appearance: x / SG: x / pH: x  Gluc: 93 mg/dL / Ketone: x  / Bili: x / Urobili: x   Blood: x / Protein: x / Nitrite: x   Leuk Esterase: x / RBC: x / WBC x   Sq Epi: x / Non Sq Epi: x / Bacteria: x           SUBJECTIVE: Pt seen and examined at the bedside by surgical team. Doing well, soft, diffuse TTP, ND.     Vital Signs Last 24 Hrs  T(C): 36.6 (29 Mar 2024 04:00), Max: 37 (28 Mar 2024 09:00)  T(F): 97.9 (29 Mar 2024 04:00), Max: 98.6 (28 Mar 2024 09:00)  HR: 70 (29 Mar 2024 04:37) (70 - 95)  BP: 154/76 (29 Mar 2024 04:37) (119/69 - 159/72)  BP(mean): 108 (29 Mar 2024 04:37) (85 - 108)  RR: 18 (29 Mar 2024 04:37) (16 - 21)  SpO2: 96% (29 Mar 2024 04:37) (95% - 98%)    Parameters below as of 29 Mar 2024 04:37  Patient On (Oxygen Delivery Method): room air        I&O's Summary    28 Mar 2024 07:01  -  29 Mar 2024 07:00  --------------------------------------------------------  IN: 580 mL / OUT: 1750 mL / NET: -1170 mL      Physical Exam:  General Appearance: Appears well, NAD  Pulmonary: Nonlabored breathing, no respiratory distress  Cardiovascular: NSR  Abdomen: Soft, nondisteded, nontender   Extremities: WWP, SCD's in place     LABS:                        8.6    7.90  )-----------( 139      ( 29 Mar 2024 05:30 )             25.5     03-29    141  |  108  |  14  ----------------------------<  93  3.6   |  25  |  0.95    Ca    9.4      29 Mar 2024 05:30  Phos  4.5     03-29  Mg     1.9     03-29    TPro  6.0  /  Alb  3.3  /  TBili  0.4  /  DBili  <0.2  /  AST  13  /  ALT  12  /  AlkPhos  64  03-27    PT/INR - ( 27 Mar 2024 09:55 )   PT: 11.2 sec;   INR: 0.98          PTT - ( 27 Mar 2024 09:55 )  PTT:39.5 sec  Urinalysis Basic - ( 29 Mar 2024 05:30 )    Color: x / Appearance: x / SG: x / pH: x  Gluc: 93 mg/dL / Ketone: x  / Bili: x / Urobili: x   Blood: x / Protein: x / Nitrite: x   Leuk Esterase: x / RBC: x / WBC x   Sq Epi: x / Non Sq Epi: x / Bacteria: x

## 2024-03-29 NOTE — DISCHARGE NOTE PROVIDER - EXTENDED VTE YES NO FOR MLM ENOXAPARIN
Skyrizi Pregnancy And Lactation Text: The risk during pregnancy and breastfeeding is uncertain with this medication. ,

## 2024-03-29 NOTE — PROVIDER CONTACT NOTE (OTHER) - ACTION/TREATMENT ORDERED:
Eben VILLAFANA assessed pt at bedside and assessed bowel movement. Pt asymptomatic. Will continue to monitor.

## 2024-03-29 NOTE — PROGRESS NOTE ADULT - ASSESSMENT
63 year old female active smoker with PMH EtOH use, HTN, Depression, Arthritis with recently discovered cecal and sigmoid polyps with pathology consistent with tubulovillous adenoma admitted for endoscopic resection vs. R hemicolectomy now s/p colonoscopy with cecal and sigmoid polypectomy c/b brisk bleeding from cecal polyp stalk (8 clips placed) requiring 1U PRBC and intermittent phenylephrine, now s/p IR angio with no active blush, transferred to SICU for hemodynamic monitoring.  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

## 2024-03-29 NOTE — DISCHARGE NOTE PROVIDER - CARE PROVIDER_API CALL
Arnaldo Flynn  Surgery  [facility] [facility]  [place] [address]  Phone: [phone]  Fax: [phone]  Follow Up Time:    Arnaldo Flynn  Surgery  [address] [facility]  [place] [address]  Phone: [phone]  Fax: [phone]  Follow Up Time:     Jayy Milner  Gastroenterology  [address] [address]  Phone: [phone]  Fax: [phone]  Follow Up Time:     Winsome Jiménez  Vascular Surgery  [address]  [place] [address]  Phone: [phone]  Fax: [phone]  Follow Up Time: 2 weeks

## 2024-03-29 NOTE — DISCHARGE NOTE PROVIDER - HOSPITAL COURSE
63 year old female use with PMH HTN, arthritis presents to [facility] ED referred by Dr. Flynn for 2 month history of abdominal pain in relation to a recently discovered polyps in cecum and sigmoid colon. Initial colonoscopy performed 2/2/24, Poor prep, scope advanced to cecum, patient was found to have a large mass just distal to cecum occluding the lumen. The patient was admitted to the surgery service on 3/25, CT CAP at that time demonstrated a polypoid lesion in the superior rectum with no evidence of metastasis to the abdomen or chest, as well as an incidental finding of a pseudoaneurysm involving the proximal celiac axis. On 3/27 the patient underwent a colonoscopy with cecal and sigmoid polypectomy complicated by brisk bleeding requiting 1 unit of packed red blood cells and intermittent phenylephrine with transfer to the surgical ICU for hemodynamic monitoring. At that time the patient was tachycardiac, an EKG demonstrated a possible new right bundle branch block, the patient also received another 2 units of packed red blood cells. A repeat CBC the following morning was stable and WNL, the patient was advanced to a clear liquid diet, Banegas catheter was removed. On 3/29 the patient passed a trial void, had one dark bloody BM, repeat Hgb was within normal limits.      63 year old female use with PMH HTN, arthritis presents to [facility] ED referred by Dr. Flynn for 2 month history of abdominal pain in relation to a recently discovered polyps in cecum and sigmoid colon. Initial colonoscopy performed 2/2/24, Poor prep, scope advanced to cecum, patient was found to have a large mass just distal to cecum occluding the lumen. The patient was admitted to the surgery service on 3/25, CT CAP at that time demonstrated a polypoid lesion in the superior rectum with no evidence of metastasis to the abdomen or chest, as well as an incidental finding of a pseudoaneurysm involving the proximal celiac axis. On 3/27 the patient underwent a colonoscopy with cecal and sigmoid polypectomy complicated by brisk bleeding requiting 1 unit of packed red blood cells and intermittent phenylephrine with transfer to the surgical ICU for hemodynamic monitoring. At that time the patient was tachycardiac, an EKG demonstrated a possible new right bundle branch block, the patient also received another 2 units of packed red blood cells. A repeat CBC the following morning was stable and WNL, the patient was advanced to a clear liquid diet, Banegas catheter was removed. On 3/29 the patient passed a trial void, had one dark bloody BM, repeat Hgb was within normal limits. On day of discharge the patient's pain was well controlled, she was tolerating her diet without nausea or vomiting, and she was hemodynamically stable for discharge home.

## 2024-03-29 NOTE — PROGRESS NOTE ADULT - ASSESSMENT
63 year old female active smoker with PMH EtOH use, HTN, Depression, Arthritis with recently discovered cecal and sigmoid polyps with pathology consistent with tubulovillous adenoma admitted for endoscopic resection vs. R hemicolectomy now s/p colonoscopy with cecal and sigmoid polypectomy c/b brisk bleeding from cecal polyp stalk (8 clips placed) requiring 1U PRBC and intermittent phenylephrine transferred to SICU for HD monitoring developed tachycardia and relative hypotension post-procedure s/p IR angiogram (3/28) without evidence of extravasation.    CLD  Pain/Nausea control   PIVs  PT/OT not needed on d/c   Outpt vascular repair

## 2024-03-30 ENCOUNTER — TRANSCRIPTION ENCOUNTER (OUTPATIENT)
Age: 64
End: 2024-03-30

## 2024-03-30 VITALS — TEMPERATURE: 97 F

## 2024-03-30 LAB
HCT VFR BLD CALC: 26.4 % — LOW (ref 34.5–45)
HGB BLD-MCNC: 8.9 G/DL — LOW (ref 11.5–15.5)
MCHC RBC-ENTMCNC: 30.8 PG — SIGNIFICANT CHANGE UP (ref 27–34)
MCHC RBC-ENTMCNC: 33.7 GM/DL — SIGNIFICANT CHANGE UP (ref 32–36)
MCV RBC AUTO: 91.3 FL — SIGNIFICANT CHANGE UP (ref 80–100)
NRBC # BLD: 0 /100 WBCS — SIGNIFICANT CHANGE UP (ref 0–0)
PLATELET # BLD AUTO: 164 K/UL — SIGNIFICANT CHANGE UP (ref 150–400)
RBC # BLD: 2.89 M/UL — LOW (ref 3.8–5.2)
RBC # FLD: 14.8 % — HIGH (ref 10.3–14.5)
WBC # BLD: 6.5 K/UL — SIGNIFICANT CHANGE UP (ref 3.8–10.5)
WBC # FLD AUTO: 6.5 K/UL — SIGNIFICANT CHANGE UP (ref 3.8–10.5)

## 2024-03-30 PROCEDURE — 84132 ASSAY OF SERUM POTASSIUM: CPT

## 2024-03-30 PROCEDURE — 74177 CT ABD & PELVIS W/CONTRAST: CPT | Mod: MC

## 2024-03-30 PROCEDURE — 82330 ASSAY OF CALCIUM: CPT

## 2024-03-30 PROCEDURE — 71260 CT THORAX DX C+: CPT | Mod: MC

## 2024-03-30 PROCEDURE — 83735 ASSAY OF MAGNESIUM: CPT

## 2024-03-30 PROCEDURE — 85014 HEMATOCRIT: CPT

## 2024-03-30 PROCEDURE — 86901 BLOOD TYPING SEROLOGIC RH(D): CPT

## 2024-03-30 PROCEDURE — 82947 ASSAY GLUCOSE BLOOD QUANT: CPT

## 2024-03-30 PROCEDURE — 84100 ASSAY OF PHOSPHORUS: CPT

## 2024-03-30 PROCEDURE — 82378 CARCINOEMBRYONIC ANTIGEN: CPT

## 2024-03-30 PROCEDURE — 81001 URINALYSIS AUTO W/SCOPE: CPT

## 2024-03-30 PROCEDURE — 84300 ASSAY OF URINE SODIUM: CPT

## 2024-03-30 PROCEDURE — 82550 ASSAY OF CK (CPK): CPT

## 2024-03-30 PROCEDURE — 84133 ASSAY OF URINE POTASSIUM: CPT

## 2024-03-30 PROCEDURE — 85730 THROMBOPLASTIN TIME PARTIAL: CPT

## 2024-03-30 PROCEDURE — 84295 ASSAY OF SERUM SODIUM: CPT

## 2024-03-30 PROCEDURE — 75726 ARTERY X-RAYS ABDOMEN: CPT | Mod: 59

## 2024-03-30 PROCEDURE — 93005 ELECTROCARDIOGRAM TRACING: CPT

## 2024-03-30 PROCEDURE — 82803 BLOOD GASES ANY COMBINATION: CPT

## 2024-03-30 PROCEDURE — 84540 ASSAY OF URINE/UREA-N: CPT

## 2024-03-30 PROCEDURE — 96374 THER/PROPH/DIAG INJ IV PUSH: CPT

## 2024-03-30 PROCEDURE — C1769: CPT

## 2024-03-30 PROCEDURE — 99232 SBSQ HOSP IP/OBS MODERATE 35: CPT

## 2024-03-30 PROCEDURE — 85610 PROTHROMBIN TIME: CPT

## 2024-03-30 PROCEDURE — 84156 ASSAY OF PROTEIN URINE: CPT

## 2024-03-30 PROCEDURE — 84484 ASSAY OF TROPONIN QUANT: CPT

## 2024-03-30 PROCEDURE — 83605 ASSAY OF LACTIC ACID: CPT

## 2024-03-30 PROCEDURE — 36245 INS CATH ABD/L-EXT ART 1ST: CPT | Mod: 59

## 2024-03-30 PROCEDURE — C1889: CPT

## 2024-03-30 PROCEDURE — 36430 TRANSFUSION BLD/BLD COMPNT: CPT

## 2024-03-30 PROCEDURE — C1894: CPT

## 2024-03-30 PROCEDURE — 83880 ASSAY OF NATRIURETIC PEPTIDE: CPT

## 2024-03-30 PROCEDURE — 86803 HEPATITIS C AB TEST: CPT

## 2024-03-30 PROCEDURE — P9016: CPT

## 2024-03-30 PROCEDURE — 82553 CREATINE MB FRACTION: CPT

## 2024-03-30 PROCEDURE — 86850 RBC ANTIBODY SCREEN: CPT

## 2024-03-30 PROCEDURE — 88305 TISSUE EXAM BY PATHOLOGIST: CPT

## 2024-03-30 PROCEDURE — 80048 BASIC METABOLIC PNL TOTAL CA: CPT

## 2024-03-30 PROCEDURE — 85027 COMPLETE CBC AUTOMATED: CPT

## 2024-03-30 PROCEDURE — 71046 X-RAY EXAM CHEST 2 VIEWS: CPT

## 2024-03-30 PROCEDURE — 82570 ASSAY OF URINE CREATININE: CPT

## 2024-03-30 PROCEDURE — 36246 INS CATH ABD/L-EXT ART 2ND: CPT

## 2024-03-30 PROCEDURE — 83935 ASSAY OF URINE OSMOLALITY: CPT

## 2024-03-30 PROCEDURE — 75774 ARTERY X-RAY EACH VESSEL: CPT | Mod: 59

## 2024-03-30 PROCEDURE — 36415 COLL VENOUS BLD VENIPUNCTURE: CPT

## 2024-03-30 PROCEDURE — 80076 HEPATIC FUNCTION PANEL: CPT

## 2024-03-30 PROCEDURE — 83036 HEMOGLOBIN GLYCOSYLATED A1C: CPT

## 2024-03-30 PROCEDURE — 86900 BLOOD TYPING SEROLOGIC ABO: CPT

## 2024-03-30 PROCEDURE — 99285 EMERGENCY DEPT VISIT HI MDM: CPT

## 2024-03-30 PROCEDURE — 36248 INS CATH ABD/L-EXT ART ADDL: CPT

## 2024-03-30 PROCEDURE — C1887: CPT

## 2024-03-30 PROCEDURE — 86923 COMPATIBILITY TEST ELECTRIC: CPT

## 2024-03-30 RX ORDER — DOCUSATE SODIUM 100 MG
1 CAPSULE ORAL
Qty: 30 | Refills: 0
Start: 2024-03-30 | End: 2024-04-28

## 2024-03-30 RX ORDER — LOSARTAN POTASSIUM 100 MG/1
1 TABLET, FILM COATED ORAL
Qty: 0 | Refills: 0 | DISCHARGE
Start: 2024-03-30

## 2024-03-30 RX ADMIN — GABAPENTIN 300 MILLIGRAM(S): 400 CAPSULE ORAL at 05:51

## 2024-03-30 RX ADMIN — LOSARTAN POTASSIUM 50 MILLIGRAM(S): 100 TABLET, FILM COATED ORAL at 05:52

## 2024-03-30 RX ADMIN — OXYCODONE HYDROCHLORIDE 5 MILLIGRAM(S): 5 TABLET ORAL at 15:53

## 2024-03-30 RX ADMIN — LIDOCAINE 1 PATCH: 4 CREAM TOPICAL at 05:53

## 2024-03-30 RX ADMIN — CHLORHEXIDINE GLUCONATE 1 APPLICATION(S): 213 SOLUTION TOPICAL at 05:52

## 2024-03-30 RX ADMIN — OXYCODONE HYDROCHLORIDE 5 MILLIGRAM(S): 5 TABLET ORAL at 08:43

## 2024-03-30 RX ADMIN — OXYCODONE HYDROCHLORIDE 5 MILLIGRAM(S): 5 TABLET ORAL at 09:45

## 2024-03-30 RX ADMIN — PANTOPRAZOLE SODIUM 40 MILLIGRAM(S): 20 TABLET, DELAYED RELEASE ORAL at 11:25

## 2024-03-30 NOTE — PROGRESS NOTE ADULT - ASSESSMENT
63F with PMHx HTN, Depression, Arthritis presents to [facility] ED referred by  Dr. Flynn for 2 month history of abdominal pain i/s/o recently discovered non obstructing mass just distal to cecum concerning for malignancy and pedunculated sigmoid polyp.     #Cecal Mass  #Pre-op  Patient underwent her initial colonoscopy on 02/02/24 and was found to have a non-obstructing cecal mass with poor prep, s/p biopsy revealing tubulovillous adenoma. Patient presenting with persistent abdominal pain for 2 months. CTA/P revealed. 2.5 x 3.7 x 2.4 cm polypoid mass seen in the superior cecum, no definite extracolonic extension. Patient is planned for repeat colonoscopy 03/27 for polyp removal and potential hemicolectomy if needed. Patient is an active smoker, 2-3 cigs/week and drinks ETOH, last drink 1 week ago. No history of ETOH withdrawal, seizures, DTs, or intubation. On ASA for primary prevention. No recent AC or NSAID use. EKG NSR. Denies chest pain, SOB or MENDOZA on ambulation or rest. ET 4-5 blocks, 4 flights of stairs with assistance from walker.   - RCRI Class I   - Chavez 0.4%  - METS >4 (ambulates with walker: ET 4-5 bocks, 4 flights of stairs)  - Continue to hold home ASA (for primary prevention, last dose 4 days ago)  - Offered Nicotine patch (smokes 2-3 cigarettes/week), however patient deferred  - Patient is low-risk for intermediate-risk procedure. No further cardiac work-up required at this time. Patient is medically optimized for upcoming colonoscopy/hemicolectomy  - S/p Colonoscopy 3/27: 7cm pedunculated and multilobular polyp in the cecum s/p snare polypectomy c/b acute bleeding s/p epinephrine injection and 8 endoclips. An additional single pedunculated 8 mm polyp of benign appearance was found in the sigmoid colon s/p polypectomy and 1 endoclip. S/p 1U pRBC given acute bleeding during the procedure and transferred to the SICU for monitoring. IR was consulted for angiography of SMA and SD on 3/27/24 which did not demonstrate evidence of active hemorrhage.   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 On home Losartan 50mg. Today, patient was hypertensive to  while off Losartan s/p Hydralazine 10mg IVPx1  - C/w Losartan 50mg qd   - If SBP >180, can give Hydralazine 10mg IVP (preferred over Labetalol given HR 60-65)    #Back Pain  Patient with known back pain 2/2 herniated discs. Patient uses lidocaine patches at home and PO Oxy 30mg PRN (uses 2-3x/month per patient).  - Recommend Lidocaine patch for mild, tylenol PRN for mod, and oxycodone 5 for severe breakthru pain    Dispo: Primary team planning to discharge today to home    Medicine will continue to follow along with you. Discussed case with Dr. Burnett

## 2024-03-30 NOTE — PROGRESS NOTE ADULT - ASSESSMENT
63 year old female active smoker with PMH EtOH use, HTN, Depression, Arthritis with recently discovered cecal and sigmoid polyps with pathology consistent with tubulovillous adenoma admitted for endoscopic resection vs. R hemicolectomy now s/p colonoscopy with cecal and sigmoid polypectomy c/b brisk bleeding from cecal polyp stalk (8 clips placed) requiring 1U PRBC and intermittent phenylephrine transferred to SICU for HD monitoring developed tachycardia and relative hypotension post-procedure s/p IR angiogram (3/28) without evidence of extravasation.    LFD  Pain/Nausea control   SCDs  OOBA  Dispo: no needs

## 2024-03-30 NOTE — PROGRESS NOTE ADULT - ATTENDING COMMENTS
63F with PMHx HTN, Depression, Arthritis ( sp Bilateral knee surgery ) active smoker, with hx of ETOH use last drink about one week ago, presents to [facility] ED referred by  Dr. Flynn for 2 month history of abdominal pain i/s/o recently discovered non obstructing mass just distal to cecum concerning for malignancy and pedunculated sigmoid polyp.   intervally -3/27- " colonoscopy with cecal and sigmoid polypectomy c/b brisk bleeding from cecal polyp stalk (8 clips placed) requiring 1U PRBC and intermittent phenylephrine transferred to SICU for HD monitoring." had Angiogram done by IR "Selective angiography performed of the SMA and SD. Angiography of the Ileocolic and Right colic/Sigmoid branches does not demonstrate evidence of active hemorrhage."     - pt seen and examined with Dr. Madera in sicu.   recovering from anesthesia, moving all limbs, answering questions, no pain reported.     -care by sicu appreciated.     -BMI - 37.9   - ASA- last taken 4 days prior to admission  - close hemodynamic monitoring.  - she take oxy ( self reported 30 mg per dose -usually at night , took about three times per month for joint pain   - Losartan - can give today, to hold on day of procedure.  - hx of ETOH use- last drink one week ago, no prior withdrawal symptoms. to monitor  - active smoker- not ready to quit, stated she is cutting down - smoking cessation counselled.  no hx of asthma no inhaler use.     - dvt prophylasix as per sicu team.  medicine will continue to follow when patient transferred out of sicu, dw 3rd daughter.
63F with PMHx HTN, Depression, Arthritis ( sp Bilateral knee surgery ) active smoker, with hx of ETOH use last drink about one week ago, presents to [facility] ED referred by  Dr. Flynn for 2 month history of abdominal pain i/s/o recently discovered non obstructing mass just distal to cecum concerning for malignancy and pedunculated sigmoid polyp.   intervally -3/27- " colonoscopy with cecal and sigmoid polypectomy c/b brisk bleeding from cecal polyp stalk (8 clips placed) requiring 1U PRBC and intermittent phenylephrine transferred to SICU for HD monitoring." had Angiogram done by IR "Selective angiography performed of the SMA and SD. 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    - pt seen and examined with Dr. Madera , now in step down   hb stable. hemodynamically stable.   bp elevated above goal  tolerating diet- good appetite  still with black stool -  ambulatory and moving limbs.     -BMI - 37.9   - ASA- last taken 4 days prior to admission  - close hemodynamic monitoring.  - she take oxy ( self reported 30 mg per dose -usually at night , took about three times per month for joint pain )  - currently getting oxy IR 5 mg q 6 hourly prn for pain.     - hx of ETOH use- last drink one week ago, no prior withdrawal symptoms. to monitor  - active smoker- not ready to quit, stated she is cutting down - smoking cessation counselled.  no hx of asthma no inhaler use.     - Acute blood loss anemia- stable hb, most recent 8.7   - renal function stable.    - HTN - on losartan 50 mg at home - BP elevated,   to increase losartan to 100 mg per day. , goal BP <130/80     clinically stable from medicine stand point- BP control is better achieved in out-patient setting. we told her that we recommend losartan 100 mg per day, and she need to follow up with pmd for monitoring of BP and to adjust meds..  thanks for allowing medicine to participate in the care.
63 female active smoker, colonoscopy with bx c/w tubulovillous adenoma p/w abdominal pain, went for endoscopic resection of cecal and sigmoid polyps which was complicated by bleeding which was controlled endoscopically with clip and epi and received 1 u pRBC. Admitted to SICU for close monitoring. Became tahcycardiac with drop in pressures and so went to IR. Hemodynamics stabilized and no active bleed found.  2 large bore IVs in place  active T&S  trending H/H  holding SQH at this time and has SCDs for DVT ppx    decision making high complexity
63F with PMHx HTN, Depression, Arthritis ( sp Bilateral knee surgery ) active smoker, with hx of ETOH use last drink about one week ago, presents to [facility] ED referred by  Dr. Flynn for 2 month history of abdominal pain i/s/o recently discovered non obstructing mass just distal to cecum concerning for malignancy and pedunculated sigmoid polyp.   intervally -3/27- " colonoscopy with cecal and sigmoid polypectomy c/b brisk bleeding from cecal polyp stalk (8 clips placed) requiring 1U PRBC and intermittent phenylephrine transferred to SICU for HD monitoring." had Angiogram done by IR "Selective angiography performed of the SMA and SD. 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    - pt seen and examined with Dr. Madera , now in step down   hb stable. hemodynamically stable.   event reviewed, she had brown BM yesterday and some black stool today, possibly residual blood coming out,   imaging, IR notes, vascular note reviewed.     -BMI - 37.9   - ASA- last taken 4 days prior to admission  - close hemodynamic monitoring.  - she take oxy ( self reported 30 mg per dose -usually at night , took about three times per month for joint pain     - hx of ETOH use- last drink one week ago, no prior withdrawal symptoms. to monitor  - active smoker- not ready to quit, stated she is cutting down - smoking cessation counselled.  no hx of asthma no inhaler use.     - Acute blood loss anemia- stable hb, most recent 8.7   - renal function stable.    - HTN - on losartan at home - to resume home med, goal BP <130/80     clinically stable , plan for discharge home today as per primary team.  thanks for allowing medicine to participate in the care.     - dvt prophylasix as per sicu team.  medicine will continue to follow when patient transferred out of sicu, dw 3rd daughter.

## 2024-03-30 NOTE — PROGRESS NOTE ADULT - PROVIDER SPECIALTY LIST ADULT
Internal Medicine
Intervent Radiology
Surgery
Vascular Surgery
Vascular Surgery
Gastroenterology
SICU
Surgery
Surgery
Colorectal Surgery
Surgery
Gastroenterology

## 2024-03-30 NOTE — PROGRESS NOTE ADULT - SUBJECTIVE AND OBJECTIVE BOX
INTERVAL HPI/OVERNIGHT EVENTS:  No acute events overnight.     SUBJECTIVE: Patient seen and examined at bedside, resting comfortably in bed, and does not appear to be in any acute distress. Patient states she feels well today. No active complaints.    ANTIBIOTICS/RELEVANT:    MEDICATIONS  (STANDING):  chlorhexidine 2% Cloths 1 Application(s) Topical <User Schedule>  gabapentin 300 milliGRAM(s) Oral every 12 hours  influenza   Vaccine 0.5 milliLiter(s) IntraMuscular once  losartan 50 milliGRAM(s) Oral daily  pantoprazole  Injectable 40 milliGRAM(s) IV Push daily    MEDICATIONS  (PRN):  ondansetron Injectable 4 milliGRAM(s) IV Push every 6 hours PRN Nausea  oxyCODONE    IR 5 milliGRAM(s) Oral every 6 hours PRN Severe Pain (7 - 10)      Vital Signs Last 24 Hrs  T(C): 36.6 (30 Mar 2024 04:45), Max: 36.9 (29 Mar 2024 17:26)  T(F): 97.8 (30 Mar 2024 04:45), Max: 98.4 (29 Mar 2024 17:26)  HR: 64 (30 Mar 2024 08:26) (64 - 88)  BP: 167/74 (30 Mar 2024 08:26) (124/62 - 167/74)  BP(mean): 107 (30 Mar 2024 08:26) (85 - 111)  RR: 16 (30 Mar 2024 08:26) (16 - 18)  SpO2: 98% (30 Mar 2024 08:26) (94% - 98%)    Parameters below as of 30 Mar 2024 08:26  Patient On (Oxygen Delivery Method): room air        PHYSICAL EXAM:  General: in no acute distress  Eyes: EOMI intact bilaterally. Anicteric sclerae, moist conjunctivae  HENT: Moist mucous membranes  Neck: Trachea midline, supple  Lungs: CTA B/L. No wheezes, rales, or rhonchi  Cardiovascular: RRR. No murmurs, rubs, or gallops  Abdomen: Soft, non-tender non-distended; No rebound or guarding  Extremities: WWP, No clubbing, cyanosis or edema  Neurological: Alert and oriented  Skin: Warm and dry. No obvious rash     LABS:                        8.7    8.73  )-----------( 135      ( 29 Mar 2024 10:57 )             25.3     03-29    141  |  108  |  14  ----------------------------<  93  3.6   |  25  |  0.95    Ca    9.4      29 Mar 2024 05:30  Phos  4.5     03-29  Mg     1.9     03-29        Urinalysis Basic - ( 29 Mar 2024 05:30 )    Color: x / Appearance: x / SG: x / pH: x  Gluc: 93 mg/dL / Ketone: x  / Bili: x / Urobili: x   Blood: x / Protein: x / Nitrite: x   Leuk Esterase: x / RBC: x / WBC x   Sq Epi: x / Non Sq Epi: x / Bacteria: x        MICROBIOLOGY:    RADIOLOGY & ADDITIONAL STUDIES:

## 2024-03-30 NOTE — DISCHARGE NOTE NURSING/CASE MANAGEMENT/SOCIAL WORK - NSDCPEFALRISK_GEN_ALL_CORE
For information on Fall & Injury Prevention, visit: [email or web address] OR  [email or web address] OR  https://www.cdc.gov/steadi/patient.html

## 2024-03-30 NOTE — PROGRESS NOTE ADULT - SUBJECTIVE AND OBJECTIVE BOX
INTERVAL HPI/OVERNIGHT EVENTS:    STATUS POST:      POST OPERATIVE DAY #:     SUBJECTIVE:      losartan 50 milliGRAM(s) Oral daily      Vital Signs Last 24 Hrs  T(C): 36.6 (30 Mar 2024 04:45), Max: 36.9 (29 Mar 2024 17:26)  T(F): 97.8 (30 Mar 2024 04:45), Max: 98.4 (29 Mar 2024 17:26)  HR: 74 (30 Mar 2024 04:00) (74 - 88)  BP: 124/62 (30 Mar 2024 04:00) (124/62 - 158/73)  BP(mean): 85 (30 Mar 2024 04:00) (85 - 111)  RR: 16 (30 Mar 2024 04:00) (16 - 18)  SpO2: 97% (30 Mar 2024 04:00) (94% - 97%)    Parameters below as of 30 Mar 2024 04:00  Patient On (Oxygen Delivery Method): room air      I&O's Detail    29 Mar 2024 07:01  -  30 Mar 2024 07:00  --------------------------------------------------------  IN:    IV PiggyBack: 100 mL    Oral Fluid: 340 mL  Total IN: 440 mL    OUT:    Voided (mL): 450 mL  Total OUT: 450 mL    Total NET: -10 mL          General: NAD, resting comfortably in bed  C/V: NSR  Pulm: Nonlabored breathing, no respiratory distress  Abd: soft, NT/ND.  Extrem: WWP, no edema, SCDs in place  Drains:  Makenzie:      LABS:                        8.7    8.73  )-----------( 135      ( 29 Mar 2024 10:57 )             25.3     03-29    141  |  108  |  14  ----------------------------<  93  3.6   |  25  |  0.95    Ca    9.4      29 Mar 2024 05:30  Phos  4.5     03-29  Mg     1.9     03-29        Urinalysis Basic - ( 29 Mar 2024 05:30 )    Color: x / Appearance: x / SG: x / pH: x  Gluc: 93 mg/dL / Ketone: x  / Bili: x / Urobili: x   Blood: x / Protein: x / Nitrite: x   Leuk Esterase: x / RBC: x / WBC x   Sq Epi: x / Non Sq Epi: x / Bacteria: x        RADIOLOGY & ADDITIONAL STUDIES:   INTERVAL HPI/OVERNIGHT EVENTS:  -dizziness/-headaches, denies bloody bms, missed void    STATUS POST: cecal and sigmoid polypectomy,     POST OPERATIVE DAY #: 3    SUBJECTIVE: Patient seen and examined at bedside with chief resident. Patient states that she is feeling well and denies any bright red blood per rectum. She states that her pain is well controlled and that she feels well enough to be discharged home today.      losartan 50 milliGRAM(s) Oral daily      Vital Signs Last 24 Hrs  T(C): 36.6 (30 Mar 2024 04:45), Max: 36.9 (29 Mar 2024 17:26)  T(F): 97.8 (30 Mar 2024 04:45), Max: 98.4 (29 Mar 2024 17:26)  HR: 74 (30 Mar 2024 04:00) (74 - 88)  BP: 124/62 (30 Mar 2024 04:00) (124/62 - 158/73)  BP(mean): 85 (30 Mar 2024 04:00) (85 - 111)  RR: 16 (30 Mar 2024 04:00) (16 - 18)  SpO2: 97% (30 Mar 2024 04:00) (94% - 97%)    Parameters below as of 30 Mar 2024 04:00  Patient On (Oxygen Delivery Method): room air      I&O's Detail    29 Mar 2024 07:01  -  30 Mar 2024 07:00  --------------------------------------------------------  IN:    IV PiggyBack: 100 mL    Oral Fluid: 340 mL  Total IN: 440 mL    OUT:    Voided (mL): 450 mL  Total OUT: 450 mL    Total NET: -10 mL          General: NAD, resting comfortably in bed  C/V: NSR  Pulm: Nonlabored breathing, no respiratory distress  Abd: soft, NT/ND.  Extrem: WWP, no edema, SCDs in place      LABS:                        8.7    8.73  )-----------( 135      ( 29 Mar 2024 10:57 )             25.3     03-29    141  |  108  |  14  ----------------------------<  93  3.6   |  25  |  0.95    Ca    9.4      29 Mar 2024 05:30  Phos  4.5     03-29  Mg     1.9     03-29        Urinalysis Basic - ( 29 Mar 2024 05:30 )    Color: x / Appearance: x / SG: x / pH: x  Gluc: 93 mg/dL / Ketone: x  / Bili: x / Urobili: x   Blood: x / Protein: x / Nitrite: x   Leuk Esterase: x / RBC: x / WBC x   Sq Epi: x / Non Sq Epi: x / Bacteria: x        RADIOLOGY & ADDITIONAL STUDIES:

## 2024-03-30 NOTE — DISCHARGE NOTE NURSING/CASE MANAGEMENT/SOCIAL WORK - NSDCFUADDAPPT_GEN_ALL_CORE_FT
Please follow up with Dr. Flynn in 1-2 weeks, you may call to schedule an appointment.  Please follow up with Dr. Jiménez in 1-2 weeks, you may call to schedule an appointment.  Please follow up with Dr. Milner in 6 months for a repeat colonoscopy.  Please see you PCP as soon as possible.

## 2024-04-04 DIAGNOSIS — Z87.19 PERSONAL HISTORY OF OTHER DISEASES OF THE DIGESTIVE SYSTEM: ICD-10-CM

## 2024-04-07 DIAGNOSIS — I10 ESSENTIAL (PRIMARY) HYPERTENSION: ICD-10-CM

## 2024-04-07 DIAGNOSIS — D12.0 BENIGN NEOPLASM OF CECUM: ICD-10-CM

## 2024-04-07 DIAGNOSIS — F17.210 NICOTINE DEPENDENCE, CIGARETTES, UNCOMPLICATED: ICD-10-CM

## 2024-04-07 DIAGNOSIS — Z96.652 PRESENCE OF LEFT ARTIFICIAL KNEE JOINT: ICD-10-CM

## 2024-04-07 DIAGNOSIS — Z79.891 LONG TERM (CURRENT) USE OF OPIATE ANALGESIC: ICD-10-CM

## 2024-04-07 DIAGNOSIS — I72.8 ANEURYSM OF OTHER SPECIFIED ARTERIES: ICD-10-CM

## 2024-04-07 DIAGNOSIS — M19.90 UNSPECIFIED OSTEOARTHRITIS, UNSPECIFIED SITE: ICD-10-CM

## 2024-04-07 DIAGNOSIS — G89.29 OTHER CHRONIC PAIN: ICD-10-CM

## 2024-04-07 DIAGNOSIS — D12.5 BENIGN NEOPLASM OF SIGMOID COLON: ICD-10-CM
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
## 2024-04-07 DIAGNOSIS — G62.9 POLYNEUROPATHY, UNSPECIFIED: ICD-10-CM

## 2024-04-07 DIAGNOSIS — I45.10 UNSPECIFIED RIGHT BUNDLE-BRANCH BLOCK: ICD-10-CM

## 2024-04-07 DIAGNOSIS — D62 ACUTE POSTHEMORRHAGIC ANEMIA: ICD-10-CM

## 2024-04-07 DIAGNOSIS — K64.8 OTHER HEMORRHOIDS: ICD-10-CM

## 2024-04-07 DIAGNOSIS — K91.840 POSTPROCEDURAL HEMORRHAGE OF A DIGESTIVE SYSTEM ORGAN OR STRUCTURE FOLLOWING A DIGESTIVE SYSTEM PROCEDURE: ICD-10-CM

## 2024-04-12 PROBLEM — C18.9 MALIGNANT NEOPLASM OF COLON, UNSPECIFIED: Chronic | Status: ACTIVE | Noted: 2024-03-27

## 2024-04-12 PROBLEM — M51.26 OTHER INTERVERTEBRAL DISC DISPLACEMENT, LUMBAR REGION: Chronic | Status: ACTIVE | Noted: 2024-03-25

## 2024-04-12 PROBLEM — I10 ESSENTIAL (PRIMARY) HYPERTENSION: Chronic | Status: ACTIVE | Noted: 2024-03-25

## 2024-04-12 PROBLEM — Z87.39 PERSONAL HISTORY OF OTHER DISEASES OF THE MUSCULOSKELETAL SYSTEM AND CONNECTIVE TISSUE: Chronic | Status: ACTIVE | Noted: 2024-03-25

## 2024-04-19 ENCOUNTER — APPOINTMENT (OUTPATIENT)
Dept: COLORECTAL SURGERY | Facility: CLINIC | Age: 64
End: 2024-04-19
Payer: MEDICAID

## 2024-04-19 VITALS
HEART RATE: 75 BPM | BODY MASS INDEX: 39.78 KG/M2 | DIASTOLIC BLOOD PRESSURE: 87 MMHG | SYSTOLIC BLOOD PRESSURE: 153 MMHG | TEMPERATURE: 97.4 F | WEIGHT: 233 LBS | HEIGHT: 64 IN

## 2024-04-19 DIAGNOSIS — D12.6 BENIGN NEOPLASM OF COLON, UNSPECIFIED: ICD-10-CM

## 2024-04-19 PROCEDURE — 99214 OFFICE O/P EST MOD 30 MIN: CPT

## 2024-04-30 ENCOUNTER — APPOINTMENT (OUTPATIENT)
Dept: VASCULAR SURGERY | Facility: CLINIC | Age: 64
End: 2024-04-30
Payer: MEDICAID

## 2024-04-30 DIAGNOSIS — I72.8 ANEURYSM OF OTHER SPECIFIED ARTERIES: ICD-10-CM

## 2024-04-30 PROCEDURE — 99204 OFFICE O/P NEW MOD 45 MIN: CPT

## 2024-04-30 RX ORDER — OXYCODONE HYDROCHLORIDE 30 MG/1
30 TABLET ORAL
Refills: 0 | Status: ACTIVE | COMMUNITY

## 2024-04-30 RX ORDER — HYDROCHLOROTHIAZIDE 25 MG/1
25 TABLET ORAL
Refills: 0 | Status: ACTIVE | COMMUNITY

## 2024-04-30 RX ORDER — ASPIRIN 81 MG
81 TABLET, DELAYED RELEASE (ENTERIC COATED) ORAL
Refills: 0 | Status: ACTIVE | COMMUNITY

## 2024-04-30 RX ORDER — LOSARTAN POTASSIUM 50 MG/1
50 TABLET, FILM COATED ORAL
Refills: 0 | Status: ACTIVE | COMMUNITY

## 2024-04-30 RX ORDER — GABAPENTIN 300 MG
300 TABLET ORAL
Refills: 0 | Status: ACTIVE | COMMUNITY

## 2024-04-30 RX ORDER — METHYLNALTREXONE BROMIDE 150 MG/1
TABLET ORAL
Refills: 0 | Status: ACTIVE | COMMUNITY

## 2024-05-01 PROBLEM — I72.8 CELIAC ARTERY ANEURYSM: Status: ACTIVE | Noted: 2024-05-01

## 2024-05-01 NOTE — PHYSICAL EXAM
[JVD] : no jugular venous distention  [Normal Thyroid] : the thyroid was normal [Carotid Bruits] : no carotid bruits [Normal Breath Sounds] : Normal breath sounds [Respiratory Effort] : normal respiratory effort [Normal Heart Sounds] : normal heart sounds [Normal Rate and Rhythm] : normal rate and rhythm [Right Carotid Bruit] : no bruit heard over the right carotid [Left Carotid Bruit] : no bruit heard over the left carotid [2+] : left 2+ [Ankle Swelling (On Exam)] : not present [Varicose Veins Of Lower Extremities] : not present [] : not present [Abdomen Masses] : No abdominal masses [Abdomen Tenderness] : ~T ~M No abdominal tenderness [No Rash or Lesion] : No rash or lesion [Purpura] : no purpura  [Petechiae] : no petechiae [Skin Ulcer] : no ulcer [Skin Induration] : no induration [Alert] : alert [Calm] : calm [de-identified] : Healthy, NAD [de-identified] : NC/AT, anicteric [de-identified] : FROM throughout, strength 5/5x4 [de-identified] : Neurosensory/neuromotor grossly intact

## 2024-05-01 NOTE — HISTORY OF PRESENT ILLNESS
[FreeTextEntry1] : 63yoF w/PMHx of HTN, HLD, OA s/p b/l TKR, recently s/p cecal and sigmoid polypectomies w/Dr. Flynn resulting in brisk bleeding requiring 1U PRBCs, incidentally noted to have a pseudoaneurysm of the proximal celiac access noted on CT a/p.  Pt referred by Dr. Flynn for intervention of the pseudoaneurysm.  She denies any residual abd pain since her procedure and denies BRBPR or melanotic stool.

## 2024-05-01 NOTE — DATA REVIEWED
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

## 2024-05-01 NOTE — ADDENDUM
[FreeTextEntry1] : This note was written by Aguila Woodward, acting as a scribe for Dr. Winsome Jiménez.  I, Dr. Winsome Jiménez, have read and attest that all the information, medical decision-making, and discharge instructions within are true and accurate.  I, Dr. Winsome Jiménez, personally performed the evaluation and management (E/M) services for this new patient.  That E/M includes conducting the initial examination, assessing all conditions, and establishing the plan of care.  Today, my ACP, Aguila Woodward, was here to observe my evaluation and management services for this patient to be followed going forward.

## 2024-05-01 NOTE — ASSESSMENT
[FreeTextEntry1] : 63yoF w/PMHx of HTN, HLD, OA s/p b/l TKR, recently s/p cecal and sigmoid polypectomies w/Dr. Flynn resulting in brisk bleeding requiring 1U PRBCs, incidentally noted to have a pseudoaneurysm of the proximal celiac access noted on CT a/p.  Pt referred by Dr. Flynn for intervention of the pseudoaneurysm.  She denies any residual abd pain since her procedure and denies BRBPR or melanotic stool.  Normal abd exam noted today.  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  Will plan for visceral angiogram in the cath lab in a few weeks to attempt to treat the celiac artery.  Discussed risks and benefits w/pt and she is in agreement w/the plan.

## 2024-05-16 DIAGNOSIS — Z91.041 RADIOGRAPHIC DYE ALLERGY STATUS: ICD-10-CM

## 2024-05-16 RX ORDER — PREDNISONE 50 MG/1
50 TABLET ORAL
Qty: 3 | Refills: 0 | Status: ACTIVE | COMMUNITY
Start: 2024-05-16 | End: 1900-01-01

## 2024-05-16 RX ORDER — DIPHENHYDRAMINE HCL 25 MG/1
25 CAPSULE ORAL
Qty: 2 | Refills: 0 | Status: ACTIVE | COMMUNITY
Start: 2024-05-16 | End: 1900-01-01

## 2024-05-20 ENCOUNTER — APPOINTMENT (OUTPATIENT)
Dept: VASCULAR SURGERY | Facility: HOSPITAL | Age: 64
End: 2024-05-20

## 2024-05-20 ENCOUNTER — INPATIENT (INPATIENT)
Facility: HOSPITAL | Age: 64
LOS: 0 days | Discharge: HOME | End: 2024-05-21
Attending: SURGERY | Admitting: SURGERY
Payer: MEDICAID

## 2024-05-20 VITALS — HEIGHT: 65 IN | WEIGHT: 229.06 LBS

## 2024-05-20 DIAGNOSIS — I72.8 ANEURYSM OF OTHER SPECIFIED ARTERIES: Chronic | ICD-10-CM

## 2024-05-20 DIAGNOSIS — Z96.652 PRESENCE OF LEFT ARTIFICIAL KNEE JOINT: Chronic | ICD-10-CM

## 2024-05-20 DIAGNOSIS — Z98.891 HISTORY OF UTERINE SCAR FROM PREVIOUS SURGERY: Chronic | ICD-10-CM

## 2024-05-20 LAB
ANION GAP SERPL CALC-SCNC: 11 MMOL/L — SIGNIFICANT CHANGE UP (ref 5–17)
BUN SERPL-MCNC: 12 MG/DL — SIGNIFICANT CHANGE UP (ref 7–23)
CALCIUM SERPL-MCNC: 9.8 MG/DL — SIGNIFICANT CHANGE UP (ref 8.4–10.5)
CHLORIDE SERPL-SCNC: 103 MMOL/L — SIGNIFICANT CHANGE UP (ref 96–108)
CO2 SERPL-SCNC: 25 MMOL/L — SIGNIFICANT CHANGE UP (ref 22–31)
CREAT SERPL-MCNC: 0.87 MG/DL — SIGNIFICANT CHANGE UP (ref 0.5–1.3)
EGFR: 75 ML/MIN/1.73M2 — SIGNIFICANT CHANGE UP
GLUCOSE SERPL-MCNC: 162 MG/DL — HIGH (ref 70–99)
HCT VFR BLD CALC: 35.3 % — SIGNIFICANT CHANGE UP (ref 34.5–45)
HGB BLD-MCNC: 11.5 G/DL — SIGNIFICANT CHANGE UP (ref 11.5–15.5)
LACTATE SERPL-SCNC: 1.2 MMOL/L — SIGNIFICANT CHANGE UP (ref 0.5–2)
MAGNESIUM SERPL-MCNC: 1.8 MG/DL — SIGNIFICANT CHANGE UP (ref 1.6–2.6)
MCHC RBC-ENTMCNC: 30.7 PG — SIGNIFICANT CHANGE UP (ref 27–34)
MCHC RBC-ENTMCNC: 32.6 GM/DL — SIGNIFICANT CHANGE UP (ref 32–36)
MCV RBC AUTO: 94.4 FL — SIGNIFICANT CHANGE UP (ref 80–100)
NRBC # BLD: 0 /100 WBCS — SIGNIFICANT CHANGE UP (ref 0–0)
PHOSPHATE SERPL-MCNC: 3.8 MG/DL — SIGNIFICANT CHANGE UP (ref 2.5–4.5)
PLATELET # BLD AUTO: 232 K/UL — SIGNIFICANT CHANGE UP (ref 150–400)
POTASSIUM SERPL-MCNC: 3.8 MMOL/L — SIGNIFICANT CHANGE UP (ref 3.5–5.3)
POTASSIUM SERPL-SCNC: 3.8 MMOL/L — SIGNIFICANT CHANGE UP (ref 3.5–5.3)
RBC # BLD: 3.74 M/UL — LOW (ref 3.8–5.2)
RBC # FLD: 14.2 % — SIGNIFICANT CHANGE UP (ref 10.3–14.5)
SODIUM SERPL-SCNC: 139 MMOL/L — SIGNIFICANT CHANGE UP (ref 135–145)
WBC # BLD: 6.96 K/UL — SIGNIFICANT CHANGE UP (ref 3.8–10.5)
WBC # FLD AUTO: 6.96 K/UL — SIGNIFICANT CHANGE UP (ref 3.8–10.5)

## 2024-05-20 PROCEDURE — 76937 US GUIDE VASCULAR ACCESS: CPT | Mod: 26,GC

## 2024-05-20 PROCEDURE — 37236 OPEN/PERQ PLACE STENT 1ST: CPT | Mod: GC

## 2024-05-20 PROCEDURE — 75625 CONTRAST EXAM ABDOMINL AORTA: CPT | Mod: 26,GC,59

## 2024-05-20 PROCEDURE — 36246 INS CATH ABD/L-EXT ART 2ND: CPT | Mod: GC

## 2024-05-20 RX ORDER — HEPARIN SODIUM 5000 [USP'U]/ML
5000 INJECTION INTRAVENOUS; SUBCUTANEOUS EVERY 8 HOURS
Refills: 0 | Status: DISCONTINUED | OUTPATIENT
Start: 2024-05-20 | End: 2024-05-20

## 2024-05-20 RX ORDER — CHLORHEXIDINE GLUCONATE 213 G/1000ML
1 SOLUTION TOPICAL ONCE
Refills: 0 | Status: DISCONTINUED | OUTPATIENT
Start: 2024-05-20 | End: 2024-05-20

## 2024-05-20 RX ORDER — POTASSIUM CHLORIDE 20 MEQ
20 PACKET (EA) ORAL ONCE
Refills: 0 | Status: COMPLETED | OUTPATIENT
Start: 2024-05-20 | End: 2024-05-20

## 2024-05-20 RX ORDER — MAGNESIUM SULFATE 500 MG/ML
1 VIAL (ML) INJECTION ONCE
Refills: 0 | Status: COMPLETED | OUTPATIENT
Start: 2024-05-20 | End: 2024-05-20

## 2024-05-20 RX ORDER — DIPHENHYDRAMINE HCL 50 MG
50 CAPSULE ORAL ONCE
Refills: 0 | Status: DISCONTINUED | OUTPATIENT
Start: 2024-05-20 | End: 2024-05-20

## 2024-05-20 RX ORDER — SODIUM CHLORIDE 9 MG/ML
1000 INJECTION, SOLUTION INTRAVENOUS
Refills: 0 | Status: DISCONTINUED | OUTPATIENT
Start: 2024-05-20 | End: 2024-05-21

## 2024-05-20 RX ORDER — HEPARIN SODIUM 5000 [USP'U]/ML
7500 INJECTION INTRAVENOUS; SUBCUTANEOUS EVERY 8 HOURS
Refills: 0 | Status: DISCONTINUED | OUTPATIENT
Start: 2024-05-20 | End: 2024-05-21

## 2024-05-20 RX ORDER — ASPIRIN/CALCIUM CARB/MAGNESIUM 324 MG
81 TABLET ORAL DAILY
Refills: 0 | Status: DISCONTINUED | OUTPATIENT
Start: 2024-05-20 | End: 2024-05-21

## 2024-05-20 RX ORDER — FENTANYL CITRATE 50 UG/ML
25 INJECTION INTRAVENOUS
Refills: 0 | Status: DISCONTINUED | OUTPATIENT
Start: 2024-05-20 | End: 2024-05-20

## 2024-05-20 RX ADMIN — SODIUM CHLORIDE 100 MILLILITER(S): 9 INJECTION, SOLUTION INTRAVENOUS at 15:30

## 2024-05-20 RX ADMIN — HEPARIN SODIUM 7500 UNIT(S): 5000 INJECTION INTRAVENOUS; SUBCUTANEOUS at 21:17

## 2024-05-20 RX ADMIN — Medication 20 MILLIEQUIVALENT(S): at 18:19

## 2024-05-20 RX ADMIN — Medication 40 MILLIGRAM(S): at 11:41

## 2024-05-20 RX ADMIN — Medication 100 GRAM(S): at 18:19

## 2024-05-20 RX ADMIN — Medication 81 MILLIGRAM(S): at 16:32

## 2024-05-20 NOTE — H&P ADULT - ASSESSMENT
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 Now s/p endovascular repair of celiac aneurysm with covered stent, being admitted for routine postoperative monitoring.    Plan  #Celiac Artery Aneurysm  - Now s/p endovascular repair with stenting of the celiac artery  - Monitoring postoperatively for any signs of bowel ischemia or abdominal pain  - Will start new medication ASA on discharge for stent patency    #HTN  - Holding home losartan 50mg QD perioperatively    #Dispo  - Likely discharge on POD1

## 2024-05-20 NOTE — BRIEF OPERATIVE NOTE - NSICDXBRIEFPROCEDURE_GEN_ALL_CORE_FT
PROCEDURES:  Open insertion of intraluminal device into celiac artery 20-May-2024 13:56:02  Shanae Vital

## 2024-05-20 NOTE — H&P ADULT - NSICDXPASTMEDICALHX_GEN_ALL_CORE_FT
PAST MEDICAL HISTORY:  Celiac artery aneurysm     Colon neoplasm, N0     H/O arthritis     HTN (hypertension)     Lumbar herniated disc

## 2024-05-20 NOTE — PROGRESS NOTE ADULT - SUBJECTIVE AND OBJECTIVE BOX
POST-OPERATIVE NOTE    Procedure: Celiac artery stenting x 2    Diagnosis/Indication: Celiac artery aneurysm    Surgeon: Jessy    S: Pt has no complaints. Denies CP, SOB, N/V. Pain controlled with medication. Denies any abdominal pain    O:                        11.5   6.96  )-----------( 232      ( 20 May 2024 16:11 )             35.3     05-20    139  |  103  |  12  ----------------------------<  162<H>  3.8   |  25  |  0.87    Ca    9.8      20 May 2024 16:11  Phos  3.8     05-20  Mg     1.8     05-20        Gen: NAD, resting comfortably in bed  C/V: NSR  Pulm: Nonlabored breathing, no respiratory distress  Abd: soft, NT/ND  Extrem: DP and PT pulses palpable bilaterally       A/P: 63yFemale s/p above procedure  Diet: DASH diet  IVF: will stop fluids once tolerating diet   Pain/nausea control

## 2024-05-20 NOTE — H&P ADULT - HISTORY OF PRESENT ILLNESS
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 Patient is currently asymptomatic. No recent symptoms of severe abdominal pain, nausea, vomiting, hemaochezia. Patient to go to cath lab today for endovascular aneurysm repair of celiac artery.

## 2024-05-20 NOTE — H&P ADULT - NSHPPHYSICALEXAM_GEN_ALL_CORE
Constitutional: Well nourished, mentating appropriately  Cardiac: NSR  Respiratory: Equal and bilateral chest rise  Abdomen: Soft, NT, ND  Extremities: WWP  Vascular: Palpable pedal pulses bilaterally

## 2024-05-20 NOTE — PATIENT PROFILE ADULT - FALL HARM RISK - HARM RISK INTERVENTIONS

## 2024-05-20 NOTE — BRIEF OPERATIVE NOTE - OPERATION/FINDINGS
R CFA access. Max sheath size 8.5F (Oscor). ProGlide used for preclose technique. 6x22 iCAST deployed into celiac artery aneurysm followed by 6x19 VBX extension more proximally due to small leak after first stent. Protamine given for reversal.

## 2024-05-20 NOTE — H&P ADULT - NSICDXPASTSURGICALHX_GEN_ALL_CORE_FT
PAST SURGICAL HISTORY:  Celiac artery aneurysm     H/O  section     H/O total knee replacement, left

## 2024-05-20 NOTE — PRE-ANESTHESIA EVALUATION ADULT - MALLAMPATI CLASS
Class II - visualization of the soft palate, fauces, and uvula up partial denture/Class II - visualization of the soft palate, fauces, and uvula

## 2024-05-21 ENCOUNTER — TRANSCRIPTION ENCOUNTER (OUTPATIENT)
Age: 64
End: 2024-05-21

## 2024-05-21 VITALS
SYSTOLIC BLOOD PRESSURE: 151 MMHG | HEART RATE: 67 BPM | OXYGEN SATURATION: 95 % | RESPIRATION RATE: 16 BRPM | DIASTOLIC BLOOD PRESSURE: 87 MMHG | TEMPERATURE: 98 F

## 2024-05-21 PROBLEM — I72.8 ANEURYSM OF OTHER SPECIFIED ARTERIES: Chronic | Status: ACTIVE | Noted: 2024-05-20

## 2024-05-21 LAB
ANION GAP SERPL CALC-SCNC: 12 MMOL/L — SIGNIFICANT CHANGE UP (ref 5–17)
BUN SERPL-MCNC: 16 MG/DL — SIGNIFICANT CHANGE UP (ref 7–23)
CALCIUM SERPL-MCNC: 9.5 MG/DL — SIGNIFICANT CHANGE UP (ref 8.4–10.5)
CHLORIDE SERPL-SCNC: 101 MMOL/L — SIGNIFICANT CHANGE UP (ref 96–108)
CO2 SERPL-SCNC: 23 MMOL/L — SIGNIFICANT CHANGE UP (ref 22–31)
CREAT SERPL-MCNC: 0.87 MG/DL — SIGNIFICANT CHANGE UP (ref 0.5–1.3)
EGFR: 75 ML/MIN/1.73M2 — SIGNIFICANT CHANGE UP
GLUCOSE SERPL-MCNC: 134 MG/DL — HIGH (ref 70–99)
HCT VFR BLD CALC: 32 % — LOW (ref 34.5–45)
HGB BLD-MCNC: 10.4 G/DL — LOW (ref 11.5–15.5)
LACTATE SERPL-SCNC: 2.2 MMOL/L — HIGH (ref 0.5–2)
LACTATE SERPL-SCNC: 2.2 MMOL/L — HIGH (ref 0.5–2)
MAGNESIUM SERPL-MCNC: 2.1 MG/DL — SIGNIFICANT CHANGE UP (ref 1.6–2.6)
MCHC RBC-ENTMCNC: 31.1 PG — SIGNIFICANT CHANGE UP (ref 27–34)
MCHC RBC-ENTMCNC: 32.5 GM/DL — SIGNIFICANT CHANGE UP (ref 32–36)
MCV RBC AUTO: 95.8 FL — SIGNIFICANT CHANGE UP (ref 80–100)
NRBC # BLD: 0 /100 WBCS — SIGNIFICANT CHANGE UP (ref 0–0)
PHOSPHATE SERPL-MCNC: 3.6 MG/DL — SIGNIFICANT CHANGE UP (ref 2.5–4.5)
PLATELET # BLD AUTO: 213 K/UL — SIGNIFICANT CHANGE UP (ref 150–400)
POTASSIUM SERPL-MCNC: 3.5 MMOL/L — SIGNIFICANT CHANGE UP (ref 3.5–5.3)
POTASSIUM SERPL-SCNC: 3.5 MMOL/L — SIGNIFICANT CHANGE UP (ref 3.5–5.3)
RBC # BLD: 3.34 M/UL — LOW (ref 3.8–5.2)
RBC # FLD: 14.1 % — SIGNIFICANT CHANGE UP (ref 10.3–14.5)
SODIUM SERPL-SCNC: 136 MMOL/L — SIGNIFICANT CHANGE UP (ref 135–145)
WBC # BLD: 9.74 K/UL — SIGNIFICANT CHANGE UP (ref 3.8–10.5)
WBC # FLD AUTO: 9.74 K/UL — SIGNIFICANT CHANGE UP (ref 3.8–10.5)

## 2024-05-21 PROCEDURE — 85347 COAGULATION TIME ACTIVATED: CPT

## 2024-05-21 PROCEDURE — C1894: CPT

## 2024-05-21 PROCEDURE — 83735 ASSAY OF MAGNESIUM: CPT

## 2024-05-21 PROCEDURE — 83605 ASSAY OF LACTIC ACID: CPT

## 2024-05-21 PROCEDURE — C1874: CPT

## 2024-05-21 PROCEDURE — 80048 BASIC METABOLIC PNL TOTAL CA: CPT

## 2024-05-21 PROCEDURE — C1725: CPT

## 2024-05-21 PROCEDURE — 36415 COLL VENOUS BLD VENIPUNCTURE: CPT

## 2024-05-21 PROCEDURE — 85027 COMPLETE CBC AUTOMATED: CPT

## 2024-05-21 PROCEDURE — C1769: CPT

## 2024-05-21 PROCEDURE — C1766: CPT

## 2024-05-21 PROCEDURE — 99232 SBSQ HOSP IP/OBS MODERATE 35: CPT

## 2024-05-21 PROCEDURE — 76000 FLUOROSCOPY <1 HR PHYS/QHP: CPT

## 2024-05-21 PROCEDURE — 99222 1ST HOSP IP/OBS MODERATE 55: CPT

## 2024-05-21 PROCEDURE — 84100 ASSAY OF PHOSPHORUS: CPT

## 2024-05-21 PROCEDURE — C1887: CPT

## 2024-05-21 PROCEDURE — C1760: CPT

## 2024-05-21 RX ORDER — ACETAMINOPHEN 500 MG
650 TABLET ORAL EVERY 6 HOURS
Refills: 0 | Status: DISCONTINUED | OUTPATIENT
Start: 2024-05-21 | End: 2024-05-21

## 2024-05-21 RX ORDER — POTASSIUM CHLORIDE 20 MEQ
40 PACKET (EA) ORAL ONCE
Refills: 0 | Status: COMPLETED | OUTPATIENT
Start: 2024-05-21 | End: 2024-05-21

## 2024-05-21 RX ORDER — ASPIRIN/CALCIUM CARB/MAGNESIUM 324 MG
1 TABLET ORAL
Qty: 30 | Refills: 0
Start: 2024-05-21 | End: 2024-06-19

## 2024-05-21 RX ORDER — ACETAMINOPHEN 500 MG
2 TABLET ORAL
Qty: 0 | Refills: 0 | DISCHARGE
Start: 2024-05-21

## 2024-05-21 RX ADMIN — HEPARIN SODIUM 7500 UNIT(S): 5000 INJECTION INTRAVENOUS; SUBCUTANEOUS at 05:50

## 2024-05-21 RX ADMIN — Medication 650 MILLIGRAM(S): at 04:41

## 2024-05-21 RX ADMIN — Medication 650 MILLIGRAM(S): at 03:26

## 2024-05-21 RX ADMIN — Medication 81 MILLIGRAM(S): at 11:50

## 2024-05-21 RX ADMIN — Medication 650 MILLIGRAM(S): at 11:15

## 2024-05-21 RX ADMIN — Medication 40 MILLIEQUIVALENT(S): at 10:13

## 2024-05-21 RX ADMIN — Medication 650 MILLIGRAM(S): at 10:13

## 2024-05-21 NOTE — CONSULT NOTE ADULT - ASSESSMENT
62 y/o F with PMH of HTN, arthritis, s/p endovascular celiac aneurysm repair.    S/p celiac aneurysm repair  Management per primary team  On ASA    HTN  Can resume home Losartan on DC    Anemia normocytic   No signs of bleeding  Will need age appropriate screening with PCP as outpatient    Obesity BMI 38  Affects all aspect of care    DVT ppx: per primary team  Discussed with primary team  64 y/o F with PMH of HTN, arthritis, s/p endovascular celiac aneurysm repair.    S/p celiac aneurysm repair  Management per primary team  On ASA    HTN  Can resume home Losartan on DC    Anemia normocytic   No signs of bleeding  Follow-up with PCP    Obesity BMI 38  Affects all aspect of care    DVT ppx: per primary team  Discussed with primary team

## 2024-05-21 NOTE — DISCHARGE NOTE PROVIDER - NSDCCPTREATMENT_GEN_ALL_CORE_FT
PRINCIPAL PROCEDURE  Procedure: Open insertion of intraluminal device into celiac artery  Findings and Treatment:

## 2024-05-21 NOTE — DISCHARGE NOTE PROVIDER - NSDCCPCAREPLAN_GEN_ALL_CORE_FT
PRINCIPAL DISCHARGE DIAGNOSIS  Diagnosis: Celiac artery aneurysm  Assessment and Plan of Treatment:       SECONDARY DISCHARGE DIAGNOSES  Diagnosis: Hypertension  Assessment and Plan of Treatment:     Diagnosis: Chronic arthritis or osteoarthritis  Assessment and Plan of Treatment:     Diagnosis: Sigmoid polyp  Assessment and Plan of Treatment:

## 2024-05-21 NOTE — CONSULT NOTE ADULT - SUBJECTIVE AND OBJECTIVE BOX
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    Patient is a 63y old  Female who presents with a chief complaint of   INTERVAL EVENTS:    SUBJECTIVE:  Patient was seen and examined at bedside.    Review of systems: No fever, chills, dizziness, HA, Changes in vision, CP, dyspnea, nausea or vomiting, dysuria, changes in bowel movements, LE edema. Rest of 12 point Review of systems negative unless otherwise documented elsewhere in note.     Diet, DASH/TLC:   Sodium & Cholesterol Restricted (05-20-24 @ 16:42) [Active]      MEDICATIONS:  MEDICATIONS  (STANDING):  aspirin enteric coated 81 milliGRAM(s) Oral daily  heparin   Injectable 7500 Unit(s) SubCutaneous every 8 hours  lactated ringers. 1000 milliLiter(s) (100 mL/Hr) IV Continuous <Continuous>    MEDICATIONS  (PRN):  acetaminophen     Tablet .. 650 milliGRAM(s) Oral every 6 hours PRN Mild Pain (1 - 3), Moderate Pain (4 - 6), Severe Pain (7 - 10)      Allergies    iodine (Unknown)    Intolerances        OBJECTIVE:  Vital Signs Last 24 Hrs  T(C): 36.1 (21 May 2024 05:00), Max: 36.7 (20 May 2024 15:18)  T(F): 97 (21 May 2024 05:00), Max: 98 (20 May 2024 15:18)  HR: 73 (21 May 2024 05:00) (52 - 82)  BP: 124/59 (21 May 2024 05:00) (124/59 - 175/93)  BP(mean): 85 (21 May 2024 05:00) (85 - 129)  RR: 18 (21 May 2024 05:00) (18 - 18)  SpO2: 98% (21 May 2024 05:00) (95% - 100%)    Parameters below as of 21 May 2024 05:00  Patient On (Oxygen Delivery Method): room air      I&O's Summary      PHYSICAL EXAM:  Gen: Reclining in bed at time of exam, appears stated age  HEENT: NCAT, MMM, clear OP  Neck: supple, trachea at midline  CV: RRR, +S1/S2  Pulm: adequate respiratory effort, no increase in work of breathing  Abd: soft, NTND  Skin: warm and dry,   Ext: WWP, no LE edema  Neuro: AOx3, speaking in full sentences  Psych: affect and behavior appropriate, pleasant at time of interview    LABS:                        10.4   9.74  )-----------( 213      ( 21 May 2024 06:35 )             32.0     05-21    136  |  101  |  16  ----------------------------<  134<H>  3.5   |  23  |  0.87    Ca    9.5      21 May 2024 06:35  Phos  3.6     05-21  Mg     2.1     05-21          CAPILLARY BLOOD GLUCOSE        Urinalysis Basic - ( 21 May 2024 06:35 )    Color: x / Appearance: x / SG: x / pH: x  Gluc: 134 mg/dL / Ketone: x  / Bili: x / Urobili: x   Blood: x / Protein: x / Nitrite: x   Leuk Esterase: x / RBC: x / WBC x   Sq Epi: x / Non Sq Epi: x / Bacteria: x        MICRODATA:      RADIOLOGY/OTHER STUDIES:     63 year old female use with PMH HTN, arthritis, cecal and sigmoid polyps (s/p removal in March 2024), anxiety presenting for endovascular 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      INTERVAL EVENTS:  POD 1 endovascular celiac artery aneurysm    SUBJECTIVE:  Patient was seen and examined at bedside. Reports feeling fine, no N.V, denies other complaints. Telemetry reviewed     Review of systems: No fever, chills, dizziness, HA, Changes in vision, CP, dyspnea, nausea or vomiting, dysuria, changes in bowel movements, LE edema. Rest of 12 point Review of systems negative unless otherwise documented elsewhere in note.     Diet, DASH/TLC:   Sodium & Cholesterol Restricted (05-20-24 @ 16:42) [Active]      MEDICATIONS:  MEDICATIONS  (STANDING):  aspirin enteric coated 81 milliGRAM(s) Oral daily  heparin   Injectable 7500 Unit(s) SubCutaneous every 8 hours  lactated ringers. 1000 milliLiter(s) (100 mL/Hr) IV Continuous <Continuous>    MEDICATIONS  (PRN):  acetaminophen     Tablet .. 650 milliGRAM(s) Oral every 6 hours PRN Mild Pain (1 - 3), Moderate Pain (4 - 6), Severe Pain (7 - 10)      Allergies    iodine (Unknown)    Intolerances        OBJECTIVE:  Vital Signs Last 24 Hrs  T(C): 36.1 (21 May 2024 05:00), Max: 36.7 (20 May 2024 15:18)  T(F): 97 (21 May 2024 05:00), Max: 98 (20 May 2024 15:18)  HR: 73 (21 May 2024 05:00) (52 - 82)  BP: 124/59 (21 May 2024 05:00) (124/59 - 175/93)  BP(mean): 85 (21 May 2024 05:00) (85 - 129)  RR: 18 (21 May 2024 05:00) (18 - 18)  SpO2: 98% (21 May 2024 05:00) (95% - 100%)    Parameters below as of 21 May 2024 05:00  Patient On (Oxygen Delivery Method): room air      I&O's Summary      PHYSICAL EXAM:  General: AOX3, NAD, lying in bed, speaking in full sentences, no labored breathing on RA  HEENT: AT/NC, no facial asymmetry   Lungs: poor inspiration, no crackles, no wheezes  Heart: regular  Abdomen: obese, soft, no distension, no tenderness  Extremities:  right groin without hematoma, warm distally, no edema, no tenderness, no focal deficit     LABS:                        10.4   9.74  )-----------( 213      ( 21 May 2024 06:35 )             32.0     05-21    136  |  101  |  16  ----------------------------<  134<H>  3.5   |  23  |  0.87    Ca    9.5      21 May 2024 06:35  Phos  3.6     05-21  Mg     2.1     05-21          CAPILLARY BLOOD GLUCOSE        Urinalysis Basic - ( 21 May 2024 06:35 )    Color: x / Appearance: x / SG: x / pH: x  Gluc: 134 mg/dL / Ketone: x  / Bili: x / Urobili: x   Blood: x / Protein: x / Nitrite: x   Leuk Esterase: x / RBC: x / WBC x   Sq Epi: x / Non Sq Epi: x / Bacteria: x        MICRODATA:      RADIOLOGY/OTHER STUDIES:

## 2024-05-21 NOTE — DISCHARGE NOTE PROVIDER - NSDCMRMEDTOKEN_GEN_ALL_CORE_FT
acetaminophen 325 mg oral tablet: 2 tab(s) orally every 6 hours As needed Mild Pain (1 - 3), Moderate Pain (4 - 6), Severe Pain (7 - 10)  aspirin 81 mg oral delayed release tablet: 1 tab(s) orally once a day  Colace 100 mg oral capsule: 1 cap(s) orally once a day as needed for  constipation MDD: 1 capsule  losartan 50 mg oral tablet: 1 tab(s) orally once a day

## 2024-05-21 NOTE — DISCHARGE NOTE NURSING/CASE MANAGEMENT/SOCIAL WORK - PATIENT PORTAL LINK FT
You can access the FollowMyHealth Patient Portal offered by [facility] by registering at the following website: [email or web address]. By joining [facility]’s FollowMyHealth portal, you will also be able to view your health information using other applications (apps) compatible with our system.

## 2024-05-21 NOTE — DISCHARGE NOTE PROVIDER - NSDCFUADDINST_GEN_ALL_CORE_FT
FOLLOW UP: Dr. Jiménez in 1 week. Your appointment has been made for 6/4/24 at 1pm. Call the office at  with any questions.  WOUND CARE: You may shower; soap and water over incision sites. Do not scrub. Pat dry when done.   ACTIVITY: Ambulate as tolerated, but no heavy lifting (>10lbs) or strenuous exercise. NO sharp neck turns. NO driving until you see surgeon in office.  If you have a persistent headache that is not improved with Tylenol, please call MD.  DIET: You may resume regular diet.   Call the office if you experience increasing pain, redness, swelling or drainage from incision sites/wounds, or temperature >101.4F.   NEW MEDICATIONS: Aspirin 81mg daily. Please  the new medication at [facility] located in the lobby of the hospital.  DISCHARGE DESTINATION: Home  Discharge Education provided: yes

## 2024-05-21 NOTE — DISCHARGE NOTE PROVIDER - HOSPITAL COURSE
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 Patient is currently asymptomatic. No recent symptoms of severe abdominal pain, nausea, vomiting, hemaochezia. On 5/20/24 patient underwnt mesenteric angiogram and celiac artery steningx2. She tolerated the procedure well. She was started on Aspirin 81mg post op. Patient was observed overnight and on POD#1 she as tolerating diet, had no abdominal pain and was ambulating and urinating without an issue. Patient stable for discharge and will follow up as outpatient.

## 2024-05-21 NOTE — PROGRESS NOTE ADULT - SUBJECTIVE AND OBJECTIVE BOX
O/N: c/o R lower back pain - given tylenol         ---------------------------------------------------------------------------  PLEASE CHECK WHEN PRESENT:     [  ]Heart Failure     [  ] Acute     [  ] Acute on Chronic     [  ] Chronic  -------------------------------------------------------------------     [  ]Diastolic [HFpEF]     [  ]Systolic [HFrEF]     [  ]Combined [HFpEF & HFrEF]  .................................................................................     [  ]Other:     [ ] Pulmonary Hypertension     [ ] Chronic A-fib     [ ] Persistet A-fib     [ ] Permanent A-fib     [ ] Paroxysmal A-fib     [ ] Hypertensive Heart Disease  -------------------------------------------------------------------  [ ] Respiratory failure  [ ] Acute cor pulmonale  [ ] Asthma/COPD Exacerbation  [ ]COPD on home O2 (Chronic renal Failure)   [ ] Pleural effusion  [ ] Aspiration pneumonia  [ ] Obstructive Sleep Apnea  [ ]Atelectasis   [ ] Acute PE   -------------------------------------------------------------------  [  ]Acute Kidney Injury      [  ]Acute Tubular Necrosis      [  ]Reneal Medullary Necrosis     [  ]Renal Cortical Necrosis     [  ]Other Pathological Lesions:    [  ]CKD 1  [  ]CKD 2  [  ]CKD 3  [  ]CKD 4  [  ]CKD 5 (ESRD)  [  ]Other  -------------------------------------------------------------------  [  ]Diabetes  [  ] Diabetic PVD Ulcer  [  ] Neuropathic ulcer to DM  [  ] Diabetes with Nephropathy  [  ] Osteomyelitis due to diabetes  [  ] Hyperglycemia   [  ]hypoglycemia   --------------------------------------------------------------------  [  ]Malnutrition: See Nutrition Note  [  ]Cachexia  [  ]Other:   [  ]Supplement Ordered:  [  ]Morbid Obesity (BMI >=40]  [ ] Ileus  ---------------------------------------------------------------------  [ ] Sepsis/severe sepsis/septic shock  [ ] Noninfectious SIRS  [ ] UTI  [ ] Pneumonia  [ ] Thrombophlebitis     -----------------------------------------------------------------------  [ ] Acidosis/alkalosis  [ ] Fluid overload  [ ] Hypokalemia  [ ] Hyperkalemia  [ ] Hypomagnesemia  [ ] Hypophosphatemia  [ ] Hyperphosphatemia  ------------------------------------------------------------------------  [ ] Acute blood loss anemia  [ ] Post op blood loss anemia  [ ] Iron deficiency anemia  [ ] Anemia due to chronic disease  [ ] Hypercoagulable state  [ ] Thrombocytopenia  ----------------------------------------------------------------------  [ ] Cerebral infarction  [ ] Transient ischemia attack  [ ] Encephalopathy - Toxic or Metabolic      Assessment:  · Assessment	  Assessment  63 year old female use with PMH HTN, arthritis, cecal and sigmoid polyps (s/p removal in March 2024), presenting for 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 Now s/p endovascular repair of celiac aneurysm with covered stent, being admitted for routine postoperative monitoring.    Plan  #Celiac Artery Aneurysm  - Now s/p endovascular repair with stenting of the celiac artery  - Monitoring postoperatively for any signs of bowel ischemia or abdominal pain  - Will start new medication ASA on discharge for stent patency    #HTN  - Holding home losartan 50mg QD perioperatively    #Dispo  - Likely discharge on POD1   O/N: c/o R lower back pain - given tylenol     S: Patient does not have any complaints    O: Examined in bed resting comfortably     ROS: Denies headache, blurred vision, chest pain, SOB, abdominal pain, nausea or vomiting.         aspirin enteric coated 81  heparin   Injectable 7500      Allergies    iodine (Unknown)    Intolerances        Vital Signs Last 24 Hrs  T(C): 36.1 (21 May 2024 05:00), Max: 36.7 (20 May 2024 15:18)  T(F): 97 (21 May 2024 05:00), Max: 98 (20 May 2024 15:18)  HR: 73 (21 May 2024 05:00) (52 - 82)  BP: 124/59 (21 May 2024 05:00) (124/59 - 175/93)  BP(mean): 85 (21 May 2024 05:00) (85 - 129)  RR: 18 (21 May 2024 05:00) (18 - 18)  SpO2: 98% (21 May 2024 05:00) (95% - 100%)    Parameters below as of 21 May 2024 05:00  Patient On (Oxygen Delivery Method): room air      I&O's Summary      Physical Exam:  General: alert and awake, NAD  Pulmonary: no respiratory distress  Cardiovascular: RRR  Abdominal: soft, non tender, non distended  Groin: R groin access site soft, no hematoma  Back: no CVA tenderness  Extremities: warm, well perfused      LABS:                        11.5   6.96  )-----------( 232      ( 20 May 2024 16:11 )             35.3     05-20    139  |  103  |  12  ----------------------------<  162<H>  3.8   |  25  |  0.87    Ca    9.8      20 May 2024 16:11  Phos  3.8     05-20  Mg     1.8     05-20          Radiology and Additional Studies:      ---------------------------------------------------------------------------  PLEASE CHECK WHEN PRESENT:     [  ]Heart Failure     [  ] Acute     [  ] Acute on Chronic     [  ] Chronic  -------------------------------------------------------------------     [  ]Diastolic [HFpEF]     [  ]Systolic [HFrEF]     [  ]Combined [HFpEF & HFrEF]  .................................................................................     [  ]Other:     [ ] Pulmonary Hypertension     [ ] Chronic A-fib     [ ] Persistet A-fib     [ ] Permanent A-fib     [ ] Paroxysmal A-fib     [x ] Hypertensive Heart Disease  -------------------------------------------------------------------  [ ] Respiratory failure  [ ] Acute cor pulmonale  [ ] Asthma/COPD Exacerbation  [ ]COPD on home O2 (Chronic renal Failure)   [ ] Pleural effusion  [ ] Aspiration pneumonia  [ ] Obstructive Sleep Apnea  [ ]Atelectasis   [ ] Acute PE   -------------------------------------------------------------------  [  ]Acute Kidney Injury      [  ]Acute Tubular Necrosis      [  ]Reneal Medullary Necrosis     [  ]Renal Cortical Necrosis     [  ]Other Pathological Lesions:    [  ]CKD 1  [  ]CKD 2  [  ]CKD 3  [  ]CKD 4  [  ]CKD 5 (ESRD)  [  ]Other  -------------------------------------------------------------------  [  ]Diabetes  [  ] Diabetic PVD Ulcer  [  ] Neuropathic ulcer to DM  [  ] Diabetes with Nephropathy  [  ] Osteomyelitis due to diabetes  [  ] Hyperglycemia   [  ]hypoglycemia   --------------------------------------------------------------------  [  ]Malnutrition: See Nutrition Note  [  ]Cachexia  [  ]Other:   [  ]Supplement Ordered:  [  ]Morbid Obesity (BMI >=40]  [ ] Ileus  ---------------------------------------------------------------------  [ ] Sepsis/severe sepsis/septic shock  [ ] Noninfectious SIRS  [ ] UTI  [ ] Pneumonia  [ ] Thrombophlebitis     -----------------------------------------------------------------------  [ ] Acidosis/alkalosis  [ ] Fluid overload  [ ] Hypokalemia  [ ] Hyperkalemia  [ ] Hypomagnesemia  [ ] Hypophosphatemia  [ ] Hyperphosphatemia  ------------------------------------------------------------------------  [ ] Acute blood loss anemia  [ ] Post op blood loss anemia  [ ] Iron deficiency anemia  [ ] Anemia due to chronic disease  [ ] Hypercoagulable state  [ ] Thrombocytopenia  ----------------------------------------------------------------------  [ ] Cerebral infarction  [ ] Transient ischemia attack  [ ] Encephalopathy - Toxic or Metabolic      Assessment:  · Assessment	  Assessment  63 year old female use with PMH HTN, arthritis, cecal and sigmoid polyps (s/p removal in March 2024), presenting for 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 Now s/p endovascular repair of celiac aneurysm with covered stent, being admitted for routine postoperative monitoring.    Plan  #Celiac Artery Aneurysm  - Now s/p endovascular repair with stenting of the celiac artery  - Monitoring postoperatively for any signs of bowel ischemia or abdominal pain  - Will start new medication ASA on discharge for stent patency    #HTN  - Holding home losartan 50mg QD perioperatively, will resume    #Dispo  - f/u am labs and d/c home    I, Arabella Padilla, have personally seen and examined the patient. I have reviewed all pertinent imaging and laboratory findings.

## 2024-05-21 NOTE — DISCHARGE NOTE PROVIDER - CARE PROVIDER_API CALL
Winsome Jiménez  Vascular Surgery  [address]  [place] [address]  Phone: [phone]  Fax: [phone]  Scheduled Appointment: 06/04/2024 01:00 PM

## 2024-05-24 DIAGNOSIS — Z96.652 PRESENCE OF LEFT ARTIFICIAL KNEE JOINT: ICD-10-CM

## 2024-05-24 DIAGNOSIS — Z88.8 ALLERGY STATUS TO OTHER DRUGS, MEDICAMENTS AND BIOLOGICAL SUBSTANCES: ICD-10-CM

## 2024-05-24 DIAGNOSIS — M51.26 OTHER INTERVERTEBRAL DISC DISPLACEMENT, LUMBAR REGION: ICD-10-CM

## 2024-05-24 DIAGNOSIS — Z86.010 PERSONAL HISTORY OF COLONIC POLYPS: ICD-10-CM

## 2024-05-24 DIAGNOSIS — I72.8 ANEURYSM OF OTHER SPECIFIED ARTERIES: ICD-10-CM

## 2024-05-24 DIAGNOSIS — I10 ESSENTIAL (PRIMARY) HYPERTENSION: ICD-10-CM

## 2024-05-24 DIAGNOSIS — M19.90 UNSPECIFIED OSTEOARTHRITIS, UNSPECIFIED SITE: ICD-10-CM

## 2024-05-25 LAB — ISTAT ACTK (ACTIVATED CLOTTING TIME KAOLIN): 282 SEC — HIGH (ref 74–137)

## 2024-05-31 NOTE — DISCUSSION/SUMMARY
[FreeTextEntry1] : 63yoF referred by Dr. 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  Pt now presents for POA after elective PTA/covered stent exclusion of celiac artery aneurysm.  Currently, she reports no new symptoms and denies any pain/pulsatility in the groin.

## 2024-05-31 NOTE — REASON FOR VISIT
[de-identified] : PTA/covered stent exclusion of celiac artery aneurysm [de-identified] : 05/20/2024 [de-identified] : 15 [de-identified] : 2 [de-identified] : 63yoF referred by Dr. 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  Pt now presents for POA after elective PTA/covered stent exclusion of celiac artery aneurysm.  Currently, she reports no new symptoms and denies any pain/pulsatility in the groin.

## 2024-05-31 NOTE — PHYSICAL EXAM
[JVD] : no jugular venous distention  [Normal Thyroid] : the thyroid was normal [Carotid Bruits] : no carotid bruits [Normal Breath Sounds] : Normal breath sounds [Respiratory Effort] : normal respiratory effort [Normal Heart Sounds] : normal heart sounds [Normal Rate and Rhythm] : normal rate and rhythm [Right Carotid Bruit] : no bruit heard over the right carotid [Left Carotid Bruit] : no bruit heard over the left carotid [2+] : left 2+ [Ankle Swelling (On Exam)] : not present [Varicose Veins Of Lower Extremities] : not present [] : not present [Abdomen Masses] : No abdominal masses [Abdomen Tenderness] : ~T ~M No abdominal tenderness [No Rash or Lesion] : No rash or lesion [Purpura] : no purpura  [Petechiae] : no petechiae [Skin Ulcer] : no ulcer [Skin Induration] : no induration [Alert] : alert [Calm] : calm [de-identified] : Healthy, NAD [de-identified] : NC/AT, anicteric [de-identified] : FROM throughout, strength 5/5x4 [de-identified] : Neurosensory/neuromotor grossly intact

## 2024-05-31 NOTE — ADDENDUM
[FreeTextEntry1] : This note was written by Aguila Woodward, acting as a scribe for Dr. Winsome Jiménez.  I, Dr. Winsome Jiménez, have read and attest that all the information, medical decision-making, and discharge instructions within are true and accurate.  I, Dr. Winsome Jiménez, personally performed the evaluation and management (E/M) services for this established patient who presents today with (an) existing condition(s).  That E/M includes conducting the examination, assessing all conditions, and (re)establishing/reinforcing a plan of care.  Today, my ACP, Aguila Woodward, was here to observe my evaluation and management services for this condition to be followed going forward.

## 2024-06-04 ENCOUNTER — APPOINTMENT (OUTPATIENT)
Dept: VASCULAR SURGERY | Facility: CLINIC | Age: 64
End: 2024-06-04

## 2024-08-01 NOTE — PROCEDURE NOTE - PROCEDURE FINDINGS AND DETAILS
Selective angiography performed of the SMA and SD. Angiography of the Ileocolic and Right colic/Sigmoid branches does not demonstrate evidence of active hemorrhage. Discharge 08/01/2024 05:52:28 PM      PATIENT REFERRED TO:  [facility]  [address]  [place] [address]  [phone]    As needed, If symptoms worsen    [facility]  [address]  [place] [id number]  [phone]  Schedule an appointment as soon as possible for a visit in 3 days        DISCHARGE MEDICATIONS:  Discharge Medication List as of 8/1/2024  5:59 PM        START taking these medications    Details   doxycycline hyclate (VIBRA-TABS) 100 MG tablet Take 1 tablet by mouth 2 times daily for 7 days, Disp-14 tablet, R-0Print             DISCONTINUED MEDICATIONS:  Discharge Medication List as of 8/1/2024  5:59 PM                 (Please note that portions of this note were completed with a voice recognition program.  Efforts were made to edit the dictations but occasionally words are mis-transcribed.)    LEI Escamilla (electronically signed)           Ashley Ochoa PA  08/01/24 [id number]

## 2025-02-21 NOTE — ED PROVIDER NOTE - WR ORDER DATE AND TIME 1
: Marin Cuenca    Relationship: Self    Best call back number: [phone]     Requested Prescriptions:   Requested Prescriptions     Pending Prescriptions Disp Refills    pregabalin (LYRICA) 100 MG capsule 270 capsule 1     Sig: Take 1 capsule by mouth 3 (Three) Times a Day.    esomeprazole (nexIUM) 40 MG capsule 180 capsule 1     Sig: Take 1 capsule by mouth 2 (Two) Times a Day.        Pharmacy where request should be sent: [facility] [id number] [address] AT [facility].  [phone] [facility] [phone] FX     Last office visit with prescribing clinician: 9/20/2024   Last telemedicine visit with prescribing clinician: Visit date not found   Next office visit with prescribing clinician: 5/9/2025     Additional details provided by patient: PT NEEDS TO SWITCH TO [facility] ON [facility]    Does the patient have less than a 3 day supply:  [x] Yes  [] No    Would you like a call back once the refill request has been completed: [] Yes [x] No    If the office needs to give you a call back, can they leave a voicemail: [] Yes [x] No    Jing Alcantar Rep   02/21/25 09:10 EST      25-Mar-2024 13:02

## (undated) DEVICE — FORCEP RADIAL JAW 4 HOT BIOPSY DISP

## (undated) DEVICE — SNARE CAPTIVATOR II RND STIFF 20MM

## (undated) DEVICE — SNARE ENDO POLY CAPTIVATOR II 33MM

## (undated) DEVICE — Device

## (undated) DEVICE — NET RETRIEVAL RESCUENET 30MM